# Patient Record
Sex: FEMALE | Race: BLACK OR AFRICAN AMERICAN | HISPANIC OR LATINO | Employment: OTHER | URBAN - METROPOLITAN AREA
[De-identification: names, ages, dates, MRNs, and addresses within clinical notes are randomized per-mention and may not be internally consistent; named-entity substitution may affect disease eponyms.]

---

## 2019-04-08 ENCOUNTER — HOSPITAL ENCOUNTER (EMERGENCY)
Facility: HOSPITAL | Age: 54
Discharge: HOME | End: 2019-04-08
Attending: EMERGENCY MEDICINE
Payer: COMMERCIAL

## 2019-04-08 VITALS
TEMPERATURE: 99 F | DIASTOLIC BLOOD PRESSURE: 61 MMHG | OXYGEN SATURATION: 98 % | SYSTOLIC BLOOD PRESSURE: 113 MMHG | HEART RATE: 87 BPM | RESPIRATION RATE: 18 BRPM

## 2019-04-08 DIAGNOSIS — M25.559 HIP PAIN: Primary | ICD-10-CM

## 2019-04-08 PROCEDURE — 99283 EMERGENCY DEPT VISIT LOW MDM: CPT

## 2019-04-08 PROCEDURE — 99283 EMERGENCY DEPT VISIT LOW MDM: CPT | Performed by: PHYSICIAN ASSISTANT

## 2019-04-08 RX ORDER — CYCLOBENZAPRINE HCL 10 MG
10 TABLET ORAL 2 TIMES DAILY PRN
Qty: 12 TABLET | Refills: 0 | Status: SHIPPED | OUTPATIENT
Start: 2019-04-08 | End: 2019-07-15 | Stop reason: ALTCHOICE

## 2019-04-08 RX ORDER — CAPSAICIN 0.07 G/100G
CREAM TOPICAL 3 TIMES DAILY
Qty: 28.3 G | Refills: 0 | Status: SHIPPED | OUTPATIENT
Start: 2019-04-08 | End: 2019-07-15 | Stop reason: ALTCHOICE

## 2019-11-12 ENCOUNTER — TRANSCRIBE ORDERS (OUTPATIENT)
Dept: ADMINISTRATIVE | Facility: HOSPITAL | Age: 54
End: 2019-11-12

## 2019-11-12 DIAGNOSIS — Z13.820 SCREENING FOR OSTEOPOROSIS: Primary | ICD-10-CM

## 2019-11-12 DIAGNOSIS — Z12.31 VISIT FOR SCREENING MAMMOGRAM: ICD-10-CM

## 2019-12-30 ENCOUNTER — HOSPITAL ENCOUNTER (OUTPATIENT)
Dept: RADIOLOGY | Facility: HOSPITAL | Age: 54
Discharge: HOME | End: 2019-12-30
Payer: COMMERCIAL

## 2019-12-30 DIAGNOSIS — Z13.820 SCREENING FOR OSTEOPOROSIS: ICD-10-CM

## 2019-12-30 DIAGNOSIS — Z12.31 VISIT FOR SCREENING MAMMOGRAM: ICD-10-CM

## 2019-12-30 PROCEDURE — 77080 DXA BONE DENSITY AXIAL: CPT

## 2019-12-30 PROCEDURE — 77067 SCR MAMMO BI INCL CAD: CPT

## 2019-12-30 PROCEDURE — 77063 BREAST TOMOSYNTHESIS BI: CPT

## 2020-11-11 ENCOUNTER — TRANSCRIBE ORDERS (OUTPATIENT)
Dept: ADMINISTRATIVE | Facility: HOSPITAL | Age: 55
End: 2020-11-11

## 2020-11-11 DIAGNOSIS — Z12.31 ENCOUNTER FOR SCREENING MAMMOGRAM FOR MALIGNANT NEOPLASM OF BREAST: Primary | ICD-10-CM

## 2021-01-05 ENCOUNTER — HOSPITAL ENCOUNTER (OUTPATIENT)
Dept: RADIOLOGY | Facility: HOSPITAL | Age: 56
Discharge: HOME | End: 2021-01-05
Payer: COMMERCIAL

## 2021-01-05 VITALS — HEIGHT: 66 IN | BODY MASS INDEX: 27.8 KG/M2 | WEIGHT: 173 LBS

## 2021-01-05 DIAGNOSIS — Z12.31 ENCOUNTER FOR SCREENING MAMMOGRAM FOR MALIGNANT NEOPLASM OF BREAST: ICD-10-CM

## 2021-01-05 PROCEDURE — 77067 SCR MAMMO BI INCL CAD: CPT

## 2021-01-05 PROCEDURE — 77063 BREAST TOMOSYNTHESIS BI: CPT

## 2022-04-26 ENCOUNTER — HOSPITAL ENCOUNTER (OUTPATIENT)
Dept: RADIOLOGY | Facility: HOSPITAL | Age: 57
Discharge: HOME | End: 2022-04-26
Payer: COMMERCIAL

## 2022-04-26 VITALS — HEIGHT: 66 IN | BODY MASS INDEX: 30.05 KG/M2 | WEIGHT: 187 LBS

## 2022-04-26 DIAGNOSIS — Z12.31 ENCOUNTER FOR SCREENING MAMMOGRAM FOR MALIGNANT NEOPLASM OF BREAST: ICD-10-CM

## 2022-04-26 PROCEDURE — 77063 BREAST TOMOSYNTHESIS BI: CPT

## 2022-04-26 PROCEDURE — 77067 SCR MAMMO BI INCL CAD: CPT

## 2022-06-03 ENCOUNTER — HOSPITAL ENCOUNTER (OUTPATIENT)
Dept: RADIOLOGY | Facility: HOSPITAL | Age: 57
Discharge: HOME | End: 2022-06-03
Payer: COMMERCIAL

## 2022-06-03 DIAGNOSIS — M25.561 RIGHT KNEE PAIN, UNSPECIFIED CHRONICITY: ICD-10-CM

## 2022-06-03 PROCEDURE — 73562 X-RAY EXAM OF KNEE 3: CPT

## 2022-07-06 ENCOUNTER — APPOINTMENT (EMERGENCY)
Dept: RADIOLOGY | Facility: HOSPITAL | Age: 57
End: 2022-07-06
Payer: COMMERCIAL

## 2022-07-06 ENCOUNTER — HOSPITAL ENCOUNTER (EMERGENCY)
Facility: HOSPITAL | Age: 57
Discharge: HOME | End: 2022-07-06
Attending: EMERGENCY MEDICINE
Payer: COMMERCIAL

## 2022-07-06 VITALS
TEMPERATURE: 98.4 F | OXYGEN SATURATION: 98 % | SYSTOLIC BLOOD PRESSURE: 115 MMHG | DIASTOLIC BLOOD PRESSURE: 68 MMHG | HEART RATE: 77 BPM | RESPIRATION RATE: 18 BRPM

## 2022-07-06 DIAGNOSIS — M25.462 EFFUSION OF LEFT KNEE: Primary | ICD-10-CM

## 2022-07-06 DIAGNOSIS — S83.92XA LEFT KNEE SPRAIN: ICD-10-CM

## 2022-07-06 PROCEDURE — 73562 X-RAY EXAM OF KNEE 3: CPT

## 2022-07-06 PROCEDURE — 99284 EMERGENCY DEPT VISIT MOD MDM: CPT | Performed by: EMERGENCY MEDICINE

## 2022-07-06 PROCEDURE — 99283 EMERGENCY DEPT VISIT LOW MDM: CPT

## 2022-07-06 RX ORDER — OXYCODONE HYDROCHLORIDE AND ACETAMINOPHEN 5; 325 MG/1; MG/1
1 TABLET ORAL EVERY 4 HOURS PRN
Qty: 12 TABLET | Refills: 0 | Status: SHIPPED | OUTPATIENT
Start: 2022-07-06 | End: 2022-07-06 | Stop reason: SDUPTHER

## 2022-07-06 RX ORDER — OXYCODONE HYDROCHLORIDE AND ACETAMINOPHEN 5; 325 MG/1; MG/1
1 TABLET ORAL EVERY 4 HOURS PRN
Qty: 12 TABLET | Refills: 0 | Status: SHIPPED | OUTPATIENT
Start: 2022-07-06 | End: 2022-07-16

## 2022-07-06 RX ORDER — OXYCODONE HYDROCHLORIDE 5 MG/1
5 TABLET ORAL ONCE
Status: COMPLETED | OUTPATIENT
Start: 2022-07-06 | End: 2022-07-06

## 2022-07-06 RX ADMIN — OXYCODONE HYDROCHLORIDE 5 MG: 5 TABLET ORAL at 16:58

## 2022-07-06 NOTE — ED ATTENDING ATTESTATION
7/6/2022  ISpring DO, saw and evaluated the patient  I have discussed the patient with the resident/non-physician practitioner and agree with the resident's/non-physician practitioner's findings, Plan of Care, and MDM as documented in the resident's/non-physician practitioner's note, except where noted  All available labs and Radiology studies were reviewed  I was present for key portions of any procedure(s) performed by the resident/non-physician practitioner and I was immediately available to provide assistance  At this point I agree with the current assessment done in the Emergency Department  I have conducted an independent evaluation of this patient a history and physical is as follows:    ED Course   55year old female presents with acute on chronic left knee pain  Pt  Reports the knee feels unstable - last night it buckled while standing  She denies fall  She has developed sore welling and significant pain with weight-bearing in the left knee since the buckling incident last evening  She has had similar pain and symptoms in the right knee  She was planning to start physical therapy for the right knee  She denies fevers or chills  No skin redness or changes  No prior knee surgery  Patient has been taking over-the-counter medications without relief  Past medical history:  Hypothyroid, asthma, osteoarthritis    Physical exam:  Patient is awake and alert, no acute distress  The left knee has a mild-to-moderate effusion  There is tenderness along the medial joint line  The knee is stable to anterior and posterior drawer testing  No pain elicited on Apley compression test   No instability with varus or valgus stress applied to the knee  No tenderness of the left hip or ankle  No lower extremity swelling  Plan:  Suspect knee sprain possible ligamentous injury verses meniscal injury  Will check x-ray  X-ray reviewed and interpreted by me no acute fracture    Will immobilize and provide crutches with follow-up to see orthopedics for re-evaluation  Patient was given a small quantity of pain medication to use for pain uncontrolled with NSAIDs     Critical Care Time  Procedures

## 2022-07-06 NOTE — ED PROVIDER NOTES
History  Chief Complaint   Patient presents with    Leg Pain     Left leg pain s/p left leg buckling last night  Pt c/o left leg and knee pain  Tried OTC pain meds and ice  Was suppose to start physical therapy tomorrow  Has been having bilateral leg pain x 1 month  HPI patient is a 81-year-old female past medical history of osteoarthritis bilateral knees  She presents today with left knee pain and instability beginning last night while standing to grab an item off a shelf  She felt her knee shift at the time and described a shooting pain up her left thigh  The patient was seen by her PCP previously for left knee pain and was given naproxen 5 mg but she was reluctant to take it "after reading side effects"  She did take one 500 mg naproxen tablet last night in addition to ice and compression but she states that it did not help  The patient also admits to a crepitus like feeling in her left knee with range of motion and walking up stairs  The pain is mostly aggravated by walking and weight-bearing on the left knee  Prior to Admission Medications   Prescriptions Last Dose Informant Patient Reported?  Taking?   ipratropium (ATROVENT) 0 03 % nasal spray   No No   Si sprays into each nostril 2 (two) times a day as needed for rhinitis   levothyroxine 125 mcg tablet   Yes No   Sig: Take 125 mcg by mouth daily      Facility-Administered Medications: None       Past Medical History:   Diagnosis Date    Disease of thyroid gland        Past Surgical History:   Procedure Laterality Date    AUGMENTATION MAMMAPLASTY Bilateral 2007    COSMETIC SURGERY      Breast implants    HERNIA REPAIR      HYSTERECTOMY      partial     NECK SURGERY         Family History   Problem Relation Age of Onset    Colon cancer Father 68    No Known Problems Mother     No Known Problems Sister     No Known Problems Daughter     Pancreatic cancer Maternal Grandmother     No Known Problems Daughter     No Known Problems Maternal Aunt     No Known Problems Paternal Aunt     No Known Problems Paternal Uncle      I have reviewed and agree with the history as documented  E-Cigarette/Vaping     E-Cigarette/Vaping Substances     Social History     Tobacco Use    Smoking status: Never Smoker    Smokeless tobacco: Never Used   Substance Use Topics    Alcohol use: Never    Drug use: Never        Review of Systems   Constitutional: Negative for chills, fatigue and fever  Respiratory: Negative for shortness of breath  Cardiovascular: Negative for palpitations  Musculoskeletal: Positive for gait problem and joint swelling (Left knee effusion)  Skin: Negative for color change, pallor, rash and wound  Neurological: Negative for dizziness and weakness  Physical Exam  ED Triage Vitals   Temperature Pulse Respirations Blood Pressure SpO2   07/06/22 1322 07/06/22 1321 07/06/22 1321 07/06/22 1321 07/06/22 1321   98 4 °F (36 9 °C) 77 18 115/68 98 %      Temp Source Heart Rate Source Patient Position - Orthostatic VS BP Location FiO2 (%)   07/06/22 1321 07/06/22 1321 -- -- --   Oral Monitor         Pain Score       07/06/22 1321       5             Orthostatic Vital Signs  Vitals:    07/06/22 1321   BP: 115/68   Pulse: 77       Physical Exam  Constitutional:       General: She is not in acute distress  Appearance: Normal appearance  HENT:      Head: Normocephalic and atraumatic  Musculoskeletal:         General: Swelling and tenderness present  No deformity  Left knee: Swelling and effusion present  No deformity, erythema, ecchymosis, lacerations or bony tenderness  Normal range of motion  Tenderness present over the medial joint line  Left lower leg: Edema present  Skin:     General: Skin is warm and dry  Findings: No bruising or erythema  Neurological:      General: No focal deficit present  Mental Status: She is alert and oriented to person, place, and time           ED Medications  Medications   oxyCODONE (ROXICODONE) IR tablet 5 mg (5 mg Oral Given 7/6/22 5721)       Diagnostic Studies  Results Reviewed     None                 XR knee 3 views left non injury    (Results Pending)         Procedures  Procedures      ED Course           Discussed case with pt and she is agreeable to be discharged home with crutches, a knee immobilizer, and pain medication  She was given information for orthopedic f/u  She was agreeable to this plan  SBIRT 20yo+    Flowsheet Row Most Recent Value   SBIRT (23 yo +)    In order to provide better care to our patients, we are screening all of our patients for alcohol and drug use  Would it be okay to ask you these screening questions? Unable to answer at this time Filed at: 07/06/2022 0323                [facility]     Left knee x-ray: no acute disease      Left knee sprain  Left knee osteoarthritis  Left knee meniscus tear  Left knee bursitis      Disposition  Final diagnoses:   Effusion of left knee   Left knee sprain     Time reflects when diagnosis was documented in both MDM as applicable and the Disposition within this note     Time User Action Codes Description Comment    7/6/2022  5:20 PM Usman Valencia Add [M25 [id number]] Effusion of left knee     7/6/2022  5:20 PM Spring Talley Add [S83  [id number]] Left knee sprain       ED Disposition     ED Disposition   Discharge    Condition   Stable    Date/Time   Wed Jul 6, 2022  5:20 PM    Comment   Haris Pena discharge to home/self care                 Follow-up Information     Follow up With Specialties Details Why Contact Info Additional [address] [facility] Schedule an appointment as soon as possible for a visit in 1 day For recheck of current symptoms Jerry Razo [address] [facility] [place], [facility], [address] [facility] Discharge Medication List as of 7/6/2022  5:27 PM      CONTINUE these medications which have CHANGED    Details   oxyCODONE-acetaminophen (PERCOCET) 5-325 mg per tablet Take 1 tablet by mouth every 4 (four) hours as needed for moderate pain for up to 10 days Max Daily Amount: 6 tablets, Starting Wed 7/6/2022, Until Sat 7/16/2022 at 2359, Normal         CONTINUE these medications which have NOT CHANGED    Details   ipratropium (ATROVENT) 0 03 % nasal spray 2 sprays into each nostril 2 (two) times a day as needed for rhinitis, Starting Mon 7/15/2019, Normal      levothyroxine 125 mcg tablet Take 125 mcg by mouth daily, Starting Mon 6/10/2019, Historical Med           No discharge procedures on file  PDMP Review     None           ED Provider  Attending physically available and evaluated Ladi Narayanan I managed the patient along with the ED Attending      Electronically Signed by         Courtney Davison,   07/06/22 [address], DO  07/06/22 9837

## 2023-03-06 LAB
EXTERNAL HIV SCREEN: NORMAL
HCV AB SER-ACNC: NON REACTIVE

## 2023-07-13 ENCOUNTER — HOSPITAL ENCOUNTER (OUTPATIENT)
Dept: RADIOLOGY | Facility: HOSPITAL | Age: 58
Discharge: HOME | End: 2023-07-13
Payer: COMMERCIAL

## 2023-07-13 VITALS — BODY MASS INDEX: 32.43 KG/M2 | HEIGHT: 63 IN | WEIGHT: 183 LBS

## 2023-07-13 DIAGNOSIS — Z12.31 ENCOUNTER FOR SCREENING MAMMOGRAM FOR MALIGNANT NEOPLASM OF BREAST: ICD-10-CM

## 2023-07-13 PROCEDURE — 77063 BREAST TOMOSYNTHESIS BI: CPT

## 2023-07-13 PROCEDURE — 77067 SCR MAMMO BI INCL CAD: CPT

## 2023-11-03 ENCOUNTER — TELEPHONE (OUTPATIENT)
Dept: PSYCHIATRY | Facility: CLINIC | Age: 58
End: 2023-11-03

## 2023-11-03 ENCOUNTER — OFFICE VISIT (OUTPATIENT)
Dept: INTERNAL MEDICINE CLINIC | Facility: CLINIC | Age: 58
End: 2023-11-03
Payer: COMMERCIAL

## 2023-11-03 VITALS
SYSTOLIC BLOOD PRESSURE: 111 MMHG | DIASTOLIC BLOOD PRESSURE: 70 MMHG | BODY MASS INDEX: 31.32 KG/M2 | HEART RATE: 68 BPM | OXYGEN SATURATION: 97 % | WEIGHT: 188 LBS | TEMPERATURE: 99.4 F | HEIGHT: 65 IN

## 2023-11-03 DIAGNOSIS — Z00.00 ENCOUNTER FOR MEDICAL EXAMINATION TO ESTABLISH CARE: ICD-10-CM

## 2023-11-03 DIAGNOSIS — Z00.00 WELLNESS EXAMINATION: Primary | ICD-10-CM

## 2023-11-03 PROCEDURE — 99386 PREV VISIT NEW AGE 40-64: CPT | Performed by: INTERNAL MEDICINE

## 2023-11-03 RX ORDER — LEVOTHYROXINE SODIUM 0.15 MG/1
150 TABLET ORAL DAILY
COMMUNITY

## 2023-11-03 NOTE — TELEPHONE ENCOUNTER
Patient LVM requesting a call back. Writer spoke with patient. 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 Writer stated one time evaluations were not done at this office and provided a telephone number for an office that performed one time evaluations.

## 2023-11-03 NOTE — ASSESSMENT & PLAN NOTE
Patient presents to the office to establish care. Patient has hypothyroidism and history of cervical spondylosis s/p fusion and discectomy. No new complaints today.

## 2023-11-03 NOTE — PROGRESS NOTES
ADULT ANNUAL [facility] INTERNAL MEDICINE TED    NAME: Christophe Anne  AGE: 63 y.o. SEX: female  : 1965     DATE: 11/3/2023     Assessment and Plan:     Problem List Items Addressed This Visit       Encounter for medical examination to establish care     Patient presents to the office to establish care. Patient has hypothyroidism and history of cervical spondylosis s/p fusion and discectomy. No new complaints today. Wellness examination - Primary     No new complaints. 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 Patient had annual physical on March this year, blood work was done and it was normal according to patient. We will get patient's consent to collect blood work results from her previous PCP. Patient has had partial hysterectomy and not having Pap smears since then. Her last Pap smear was normal according to patient. Last mammogram was this year and it was normal.  Patient had a colonoscopy last year and it was normal.  Last ophthalmology visit was in 2019, unremarkable. Has a family history of prostate cancer and hypertension. Plan  Psychological assessment: for work  Patient refused flu shot  Would collect her blood work results from previous PCP. Continue levothyroxine 150 mcg. Relevant Orders    Ambulatory referral to Psych Services       Immunizations and preventive care screenings were discussed with patient today. Appropriate education was printed on patient's after visit summary. Counseling:  Dental Health: discussed importance of regular tooth brushing, flossing, and dental visits. Injury prevention: discussed safety/seat belts, safety helmets, smoke detectors, carbon dioxide detectors, and smoking near bedding or upholstery.   Sexual health: discussed sexually transmitted diseases, partner selection, use of condoms, avoidance of unintended pregnancy, and contraceptive alternatives. Exercise: the importance of regular exercise/physical activity was discussed. Recommend exercise 3-5 times per week for at least 30 minutes. BMI Counseling: Body mass index is 31.53 kg/m². The BMI is above normal. Nutrition recommendations include encouraging healthy choices of fruits and vegetables and moderation in carbohydrate intake. Exercise recommendations include moderate physical activity 150 minutes/week. No pharmacotherapy was ordered. Rationale for BMI follow-up plan is due to patient being overweight or obese. Depression Screening and Follow-up Plan: Patient was screened for depression during today's encounter. They screened negative with a PHQ-2 score of 0. No follow-ups on file. Chief Complaint:     Chief Complaint   Patient presents with    Physical Exam     For work    Establish Care      History of Present Illness:     Adult Annual Physical   Patient here for a comprehensive physical exam. The patient reports no problems. Diet and Physical Activity  Diet/Nutrition: well balanced diet. Exercise: 3-4 times a week on average. Depression Screening  PHQ-2/9 Depression Screening    Little interest or pleasure in doing things: 0 - not at all  Feeling down, depressed, or hopeless: 0 - not at all  PHQ-2 Score: 0  PHQ-2 Interpretation: Negative depression screen       General Health  Sleep: sleeps well. Hearing: normal - bilateral.  Vision: no vision problems. Dental: regular dental visits. /GYN Health  Patient is: postmenopausal  Last menstrual period: cannot recall, years back. Contraceptive method: not using any    Advanced Care Planning  Do you have an advanced directive? no  Do you have a durable medical power of ? no     Review of Systems:     Review of Systems   Constitutional:  Negative for chills, fatigue and fever. HENT:  Negative for congestion and sore throat.     Respiratory:  Negative for cough and shortness of breath. Cardiovascular:  Negative for chest pain, palpitations and leg swelling. Gastrointestinal:  Negative for abdominal pain, blood in stool, constipation, diarrhea, nausea and vomiting. Genitourinary:  Negative for dysuria, flank pain and frequency. Skin:  Negative for pallor and rash. Neurological:  Negative for dizziness, light-headedness and headaches. Psychiatric/Behavioral:  Negative for agitation, behavioral problems and confusion. Past Medical History:     Past Medical History:   Diagnosis Date    Disease of thyroid gland       Past Surgical History:     Past Surgical History:   Procedure Laterality Date    AUGMENTATION MAMMAPLASTY Bilateral 2007    [place]    COSMETIC SURGERY      Breast implants    HERNIA REPAIR      HYSTERECTOMY      partial     NECK SURGERY        Social History:     Social History     Socioeconomic History    Marital status: /Civil Union     Spouse name: None    Number of children: None    Years of education: None    Highest education level: None   Occupational History    None   Tobacco Use    Smoking status: Never    Smokeless tobacco: Never   Substance and Sexual Activity    Alcohol use: Never    Drug use: Never    Sexual activity: None   Other Topics Concern    None   Social History Narrative    None     Social Determinants of Health     Financial Resource Strain: Low Risk  (11/3/2023)    Overall Financial Resource Strain (CARDIA)     Difficulty of Paying Living Expenses: Not hard at all   Food Insecurity: No Food Insecurity (11/3/2023)    Hunger Vital Sign     Worried About Running Out of Food in the Last Year: Never true     Ran Out of Food in the Last Year: Never true   Transportation Needs: No Transportation Needs (11/3/2023)    PRAPARE - Transportation     Lack of Transportation (Medical): No     Lack of Transportation (Non-Medical):  No   Physical Activity: Sufficiently Active (11/3/2023)    Exercise Vital Sign     Days of Exercise per Week: 4 days     Minutes of Exercise per Session: 60 min   Stress: No Stress Concern Present (11/3/2023)    [facility]     Feeling of Stress : Not at all   Social Connections: [facility] (11/3/2023)    Social Connection and Isolation Panel [NHANES]     Frequency of Communication with Friends and Family: More than three times a week     Frequency of Social Gatherings with Friends and Family: More than three times a week     Attends [employer] Services: More than 4 times per year     Active Member of [employer] or Organizations: Yes     Attends Club or Organization Meetings: More than 4 times per year     Marital Status:    Intimate Partner Violence: Not At Risk (11/3/2023)    Humiliation, Afraid, Rape, and Kick questionnaire     Fear of Current or Ex-Partner: No     Emotionally Abused: No     Physically Abused: No     Sexually Abused: No   Housing Stability: Unknown (11/3/2023)    Housing Stability Vital Sign     Unable to Pay for Housing in the Last Year: No     Number of [address] in the Last Year: Not on file     Unstable Housing in the Last Year: No      Family History:     Family History   Problem Relation Age of Onset    Colon cancer Father 68    No Known Problems Mother     No Known Problems Sister     No Known Problems Daughter     Pancreatic cancer Maternal Grandmother     No Known Problems Daughter     No Known Problems Maternal Aunt     No Known Problems Paternal Aunt     No Known Problems Paternal Uncle       Current Medications:     Current Outpatient Medications   Medication Sig Dispense Refill    levothyroxine 150 mcg tablet Take 150 mcg by mouth daily      ipratropium (ATROVENT) 0.03 % nasal spray 2 sprays into each nostril 2 (two) times a day as needed for rhinitis (Patient not taking: Reported on 11/3/2023) 30 mL 3    levothyroxine 125 mcg tablet Take 125 mcg by mouth daily (Patient not taking: Reported on 11/3/2023)  0 No current facility-administered medications for this visit. Allergies:     No Known Allergies   Physical Exam:     /70 (BP Location: Right arm, Patient Position: Sitting, Cuff Size: Large)   Pulse 68   Temp 99.4 °F (37.4 °C) (Tympanic)   Ht 5' 4.75" (1.645 m)   Wt 85.3 kg (188 lb)   SpO2 97%   BMI 31.53 kg/m²     Physical Exam  Vitals reviewed. Constitutional:       General: She is not in acute distress. Appearance: Normal appearance. She is not ill-appearing, toxic-appearing or diaphoretic. HENT:      Head: Normocephalic. Nose: No congestion. Mouth/Throat:      Mouth: Mucous membranes are moist.      Pharynx: Oropharynx is clear. Cardiovascular:      Rate and Rhythm: Normal rate and regular rhythm. Pulses: Normal pulses. Heart sounds: Normal heart sounds. Pulmonary:      Effort: Pulmonary effort is normal.      Breath sounds: Normal breath sounds. Abdominal:      General: Bowel sounds are normal.      Palpations: Abdomen is soft. Musculoskeletal:      Right lower leg: No edema. Left lower leg: No edema. Skin:     General: Skin is warm and dry. Capillary Refill: Capillary refill takes less than 2 seconds. Neurological:      General: No focal deficit present. Mental Status: She is alert and oriented to person, place, and time.    Psychiatric:         Mood and Affect: Mood normal.         Behavior: Behavior normal.          Logan Aguilar MD  [facility] INTERNAL MEDICINE [facility]

## 2023-11-03 NOTE — ASSESSMENT & PLAN NOTE
No new complaints. 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 Patient had annual physical on March this year, blood work was done and it was normal according to patient. We will get patient's consent to collect blood work results from her previous PCP. Patient has had partial hysterectomy and not having Pap smears since then. Her last Pap smear was normal according to patient. Last mammogram was this year and it was normal.  Patient had a colonoscopy last year and it was normal.  Last ophthalmology visit was in 2019, unremarkable. Has a family history of prostate cancer and hypertension. Plan  Psychological assessment: for work  Patient refused flu shot  Would collect her blood work results from previous PCP. Continue levothyroxine 150 mcg.

## 2023-11-08 ENCOUNTER — TELEPHONE (OUTPATIENT)
Dept: PSYCHIATRY | Facility: CLINIC | Age: 58
End: 2023-11-08

## 2023-11-08 NOTE — TELEPHONE ENCOUNTER
Writer has sent [employer] referral message if no response within 15 days writer will reach out 1x via phone. Then referral will be closed if no response.

## 2024-02-21 ENCOUNTER — APPOINTMENT (EMERGENCY)
Dept: CT IMAGING | Facility: HOSPITAL | Age: 59
End: 2024-02-21
Payer: COMMERCIAL

## 2024-02-21 ENCOUNTER — HOSPITAL ENCOUNTER (EMERGENCY)
Facility: HOSPITAL | Age: 59
Discharge: HOME | End: 2024-02-21
Attending: EMERGENCY MEDICINE
Payer: COMMERCIAL

## 2024-02-21 VITALS
DIASTOLIC BLOOD PRESSURE: 72 MMHG | TEMPERATURE: 98.4 F | SYSTOLIC BLOOD PRESSURE: 112 MMHG | RESPIRATION RATE: 16 BRPM | WEIGHT: 195.55 LBS | OXYGEN SATURATION: 98 % | BODY MASS INDEX: 32.79 KG/M2 | HEART RATE: 88 BPM

## 2024-02-21 DIAGNOSIS — R10.31 ACUTE BILATERAL LOWER ABDOMINAL PAIN: ICD-10-CM

## 2024-02-21 DIAGNOSIS — K57.92 DIVERTICULITIS: Primary | ICD-10-CM

## 2024-02-21 DIAGNOSIS — R10.32 ACUTE BILATERAL LOWER ABDOMINAL PAIN: ICD-10-CM

## 2024-02-21 LAB
ALBUMIN SERPL BCP-MCNC: 4.3 G/DL (ref 3.5–5)
ALP SERPL-CCNC: 101 U/L (ref 34–104)
ALT SERPL W P-5'-P-CCNC: 20 U/L (ref 7–52)
AMORPH URATE CRY URNS QL MICRO: NORMAL
ANION GAP SERPL CALCULATED.3IONS-SCNC: 6 MMOL/L
AST SERPL W P-5'-P-CCNC: 28 U/L (ref 13–39)
BACTERIA UR QL AUTO: NORMAL /HPF
BASOPHILS # BLD AUTO: 0.03 THOUSANDS/ÂΜL (ref 0–0.1)
BASOPHILS NFR BLD AUTO: 0 % (ref 0–1)
BILIRUB SERPL-MCNC: 0.3 MG/DL (ref 0.2–1)
BILIRUB UR QL STRIP: NEGATIVE
BUN SERPL-MCNC: 20 MG/DL (ref 5–25)
CALCIUM SERPL-MCNC: 9.2 MG/DL (ref 8.4–10.2)
CHLORIDE SERPL-SCNC: 107 MMOL/L (ref 96–108)
CLARITY UR: ABNORMAL
CO2 SERPL-SCNC: 27 MMOL/L (ref 21–32)
COLOR UR: ABNORMAL
CREAT SERPL-MCNC: 0.8 MG/DL (ref 0.6–1.3)
EOSINOPHIL # BLD AUTO: 0.22 THOUSAND/ÂΜL (ref 0–0.61)
EOSINOPHIL NFR BLD AUTO: 3 % (ref 0–6)
ERYTHROCYTE [DISTWIDTH] IN BLOOD BY AUTOMATED COUNT: 13.4 % (ref 11.6–15.1)
GFR SERPL CREATININE-BSD FRML MDRD: 82 ML/MIN/1.73SQ M
GLUCOSE SERPL-MCNC: 114 MG/DL (ref 65–140)
GLUCOSE UR STRIP-MCNC: NEGATIVE MG/DL
HCT VFR BLD AUTO: 38.3 % (ref 34.8–46.1)
HGB BLD-MCNC: 12.6 G/DL (ref 11.5–15.4)
HGB UR QL STRIP.AUTO: NEGATIVE
IMM GRANULOCYTES # BLD AUTO: 0.02 THOUSAND/UL (ref 0–0.2)
IMM GRANULOCYTES NFR BLD AUTO: 0 % (ref 0–2)
KETONES UR STRIP-MCNC: NEGATIVE MG/DL
LEUKOCYTE ESTERASE UR QL STRIP: NEGATIVE
LIPASE SERPL-CCNC: 34 U/L (ref 11–82)
LYMPHOCYTES # BLD AUTO: 2.2 THOUSANDS/ÂΜL (ref 0.6–4.47)
LYMPHOCYTES NFR BLD AUTO: 27 % (ref 14–44)
MCH RBC QN AUTO: 31.6 PG (ref 26.8–34.3)
MCHC RBC AUTO-ENTMCNC: 32.9 G/DL (ref 31.4–37.4)
MCV RBC AUTO: 96 FL (ref 82–98)
MONOCYTES # BLD AUTO: 0.66 THOUSAND/ÂΜL (ref 0.17–1.22)
MONOCYTES NFR BLD AUTO: 8 % (ref 4–12)
NEUTROPHILS # BLD AUTO: 4.92 THOUSANDS/ÂΜL (ref 1.85–7.62)
NEUTS SEG NFR BLD AUTO: 62 % (ref 43–75)
NITRITE UR QL STRIP: NEGATIVE
NON-SQ EPI CELLS URNS QL MICRO: NORMAL /HPF
NRBC BLD AUTO-RTO: 0 /100 WBCS
PH UR STRIP.AUTO: 8 [PH]
PLATELET # BLD AUTO: 272 THOUSANDS/UL (ref 149–390)
PMV BLD AUTO: 9.7 FL (ref 8.9–12.7)
POTASSIUM SERPL-SCNC: 5.2 MMOL/L (ref 3.5–5.3)
PROT SERPL-MCNC: 7.3 G/DL (ref 6.4–8.4)
PROT UR STRIP-MCNC: ABNORMAL MG/DL
RBC # BLD AUTO: 3.99 MILLION/UL (ref 3.81–5.12)
RBC #/AREA URNS AUTO: NORMAL /HPF
SODIUM SERPL-SCNC: 140 MMOL/L (ref 135–147)
SP GR UR STRIP.AUTO: 1.02 (ref 1–1.03)
UROBILINOGEN UR STRIP-ACNC: <2 MG/DL
WBC # BLD AUTO: 8.05 THOUSAND/UL (ref 4.31–10.16)
WBC #/AREA URNS AUTO: NORMAL /HPF

## 2024-02-21 PROCEDURE — 83690 ASSAY OF LIPASE: CPT

## 2024-02-21 PROCEDURE — 85025 COMPLETE CBC W/AUTO DIFF WBC: CPT

## 2024-02-21 PROCEDURE — 96361 HYDRATE IV INFUSION ADD-ON: CPT

## 2024-02-21 PROCEDURE — 81001 URINALYSIS AUTO W/SCOPE: CPT | Performed by: PHYSICIAN ASSISTANT

## 2024-02-21 PROCEDURE — 96374 THER/PROPH/DIAG INJ IV PUSH: CPT

## 2024-02-21 PROCEDURE — 99284 EMERGENCY DEPT VISIT MOD MDM: CPT

## 2024-02-21 PROCEDURE — G1004 CDSM NDSC: HCPCS

## 2024-02-21 PROCEDURE — 36415 COLL VENOUS BLD VENIPUNCTURE: CPT

## 2024-02-21 PROCEDURE — 80053 COMPREHEN METABOLIC PANEL: CPT

## 2024-02-21 PROCEDURE — 74177 CT ABD & PELVIS W/CONTRAST: CPT

## 2024-02-21 PROCEDURE — 99285 EMERGENCY DEPT VISIT HI MDM: CPT | Performed by: PHYSICIAN ASSISTANT

## 2024-02-21 RX ORDER — KETOROLAC TROMETHAMINE 30 MG/ML
15 INJECTION, SOLUTION INTRAMUSCULAR; INTRAVENOUS ONCE
Status: COMPLETED | OUTPATIENT
Start: 2024-02-21 | End: 2024-02-21

## 2024-02-21 RX ORDER — METRONIDAZOLE 500 MG/1
500 TABLET ORAL ONCE
Status: COMPLETED | OUTPATIENT
Start: 2024-02-21 | End: 2024-02-21

## 2024-02-21 RX ORDER — CIPROFLOXACIN 500 MG/1
500 TABLET, FILM COATED ORAL 2 TIMES DAILY
Qty: 20 TABLET | Refills: 0 | Status: SHIPPED | OUTPATIENT
Start: 2024-02-21 | End: 2024-03-02

## 2024-02-21 RX ORDER — METRONIDAZOLE 500 MG/1
500 TABLET ORAL EVERY 8 HOURS SCHEDULED
Qty: 30 TABLET | Refills: 0 | Status: SHIPPED | OUTPATIENT
Start: 2024-02-21 | End: 2024-03-02

## 2024-02-21 RX ORDER — ONDANSETRON 4 MG/1
4 TABLET, FILM COATED ORAL EVERY 8 HOURS PRN
Qty: 9 TABLET | Refills: 0 | Status: SHIPPED | OUTPATIENT
Start: 2024-02-21 | End: 2024-02-24

## 2024-02-21 RX ORDER — CIPROFLOXACIN 500 MG/1
500 TABLET, FILM COATED ORAL ONCE
Status: COMPLETED | OUTPATIENT
Start: 2024-02-21 | End: 2024-02-21

## 2024-02-21 RX ADMIN — METRONIDAZOLE 500 MG: 500 TABLET ORAL at 22:25

## 2024-02-21 RX ADMIN — KETOROLAC TROMETHAMINE 15 MG: 30 INJECTION, SOLUTION INTRAMUSCULAR; INTRAVENOUS at 20:19

## 2024-02-21 RX ADMIN — CIPROFLOXACIN HYDROCHLORIDE 500 MG: 500 TABLET, FILM COATED ORAL at 22:25

## 2024-02-21 RX ADMIN — SODIUM CHLORIDE 1000 ML: 0.9 INJECTION, SOLUTION INTRAVENOUS at 19:38

## 2024-02-21 RX ADMIN — IOHEXOL 100 ML: 350 INJECTION, SOLUTION INTRAVENOUS at 21:08

## 2024-02-21 NOTE — Clinical Note
Gretel Chowdhury was seen and treated in our emergency department on 2/21/2024.                Diagnosis:     Gretel  .    She may return on this date: 02/23/2024         If you have any questions or concerns, please don't hesitate to call.      David Wolf PA-C    ______________________________           _______________          _______________  Hospital Representative                              Date                                Time

## 2024-02-22 NOTE — DISCHARGE INSTRUCTIONS
Show images for CT abdomen pelvis with contrast  Study Result    Wet Read   Reading Physician Reading Date Result Priority  Tejas Henley MD  [phone] 2/21/2024    Narrative & Impression  CT ABDOMEN AND PELVIS WITH IV CONTRAST     INDICATION: suprapubic pain.     COMPARISON: None.     TECHNIQUE: CT examination of the abdomen and pelvis was performed. Multiplanar 2D reformatted images were created from the source data.     This examination, like all CT scans performed in the [facility], was performed utilizing techniques to minimize radiation dose exposure, including the use of iterative reconstruction and automated exposure control. Radiation dose length  product (DLP) for this visit: 814 mGy-cm     IV Contrast: 100 mL of iohexol (OMNIPAQUE)  Enteric Contrast: Not administered.     FINDINGS:     ABDOMEN     LOWER CHEST: No clinically significant abnormality in the visualized lower chest. Partially visualized bilateral lower chest breast implant prosthesis without evidence for extracapsular rupture.     LIVER/BILIARY TREE: Liver is norm  al in size and contour without enhancing mass. Multiple too small to characterize hepatic hypodensities noted.     GALLBLADDER: Contracted without wall thickening or calcified gallstones. No pericholecystic inflammatory change.     SPLEEN: Unremarkable.     PANCREAS: Unremarkable.     ADRENAL GLANDS: Unremarkable.     KIDNEYS/URETERS: Unremarkable. No hydronephrosis.     STOMACH AND BOWEL: Stomach is moderately distended with heterogeneous density debris. The small and large bowel loops appear normal in course and caliber without obstruction seen. Terminal ileum and appendix are unremarkable. Extensive descending and sigmoid colon diverticulosis noted. Abnormal wall thickening and mild surrounding pelvic mesenteric inflammatory stranding involving the proximal sigmoid colon is seen without pericolonic free air or abscess/fluid collection. Findings suggest acute  focal colitis/diverticulitis. Cannot exclude neoplasm with superimposed infection. Recommend follow-up colonoscopy when c  linically appropriate.     APPENDIX: No findings to suggest appendicitis.     ABDOMINOPELVIC CAVITY: Trace lower pelvic free fluid. No loculated fluid collections. No pneumoperitoneum. No lymphadenopathy by size criteria. Nonspecific subcentimeter mesenteric lymph nodes noted.     VESSELS: Unremarkable for patient's age.     PELVIS     REPRODUCTIVE ORGANS: Post hysterectomy. A few punctate left pelvic phleboliths.     URINARY BLADDER: Unremarkable.     ABDOMINAL WALL/INGUINAL REGIONS: Unremarkable.     BONES: No acute fracture or suspicious osseous lesion.     IMPRESSION:  Findings consistent with acute focal colitis/diverticulitis of the proximal sigmoid colon.  Cannot exclude neoplasm with superimposed infection. Recommend follow-up colonoscopy when clinically appropriate. Extensive descending and sigmoid diverticulosis.   Trace lower pelvic free fluid. No evidence for bowel obstruction, obstructive uropathy, appendicitis, free air or loculated fluid collections in the abdomen/pelvi  s.           Workstation performed: [id number]       Interpreted by David Wolf PA-C on 2/21/2024 10:10 PM

## 2024-02-22 NOTE — ED PROVIDER NOTES
History  Chief Complaint   Patient presents with    Abdominal Pain     Pt reports lower abdominal pain since Sunday. Reported diarrhea on Sunday and since then has been experiencing this sharp pain. Pain is now dull. Denies urinary sx.      Patient is a 58-year-old female with a history of hysterectomy presents to the emergency department with dull aching persistent nonradiating lower abdominal pain for 3 days.  Patient also states that she has associate symptomatology of urinary frequency getting with the current ED presentation of lower abdominal pain.  Patient denies recent antibiotic use.  Patient does say that she had diarrhea symptoms starting with a lower abdominal pain, however diarrhea symptoms had completely dated by Monday of this week, total of 1 day of diarrhea.  Patient denies vaginal bleeding and vaginal discharge.  Patient denies palliative factors with provocative factors of pressure to lower abdomen.  Patient has not effective treatment.  Patient denies fevers, chills, nausea, vomiting, diarrhea, and constipation.  Patient denies recent fall recent trauma.  Patient denies sick contacts or recent travel.  Patient denies chest pain and shortness of breath.      History provided by:  Patient   used: No    Abdominal Pain  Associated symptoms: no chest pain, no chills, no constipation, no cough, no diarrhea, no dysuria, no fever, no nausea, no shortness of breath, no sore throat and no vomiting        Prior to Admission Medications   Prescriptions Last Dose Informant Patient Reported? Taking?   levothyroxine 150 mcg tablet   Yes No   Sig: Take 150 mcg by mouth daily      Facility-Administered Medications: None       Past Medical History:   Diagnosis Date    Disease of thyroid gland        Past Surgical History:   Procedure Laterality Date    AUGMENTATION MAMMAPLASTY Bilateral 2007    [place]    COSMETIC SURGERY      Breast implants    HERNIA REPAIR      HYSTERECTOMY      partial      NECK SURGERY         Family History   Problem Relation Age of Onset    Colon cancer Father 73    No Known Problems Mother     No Known Problems Sister     No Known Problems Daughter     Pancreatic cancer Maternal Grandmother     No Known Problems Daughter     No Known Problems Maternal Aunt     No Known Problems Paternal Aunt     No Known Problems Paternal Uncle      I have reviewed and agree with the history as documented.    E-Cigarette/Vaping     E-Cigarette/Vaping Substances     Social History     Tobacco Use    Smoking status: Never    Smokeless tobacco: Never   Substance Use Topics    Alcohol use: Never    Drug use: Never       Review of Systems   Constitutional:  Negative for activity change, appetite change, chills and fever.   HENT:  Negative for congestion, postnasal drip, rhinorrhea, sinus pressure, sinus pain, sore throat and tinnitus.    Eyes:  Negative for photophobia and visual disturbance.   Respiratory:  Negative for cough, chest tightness and shortness of breath.    Cardiovascular:  Negative for chest pain and palpitations.   Gastrointestinal:  Positive for abdominal pain. Negative for constipation, diarrhea, nausea and vomiting.   Genitourinary:  Positive for urgency. Negative for difficulty urinating, dysuria, flank pain and frequency.   Musculoskeletal:  Negative for back pain, gait problem, neck pain and neck stiffness.   Skin:  Negative for pallor and rash.   Allergic/Immunologic: Negative for environmental allergies and food allergies.   Neurological:  Negative for dizziness, weakness, numbness and headaches.   Psychiatric/Behavioral:  Negative for confusion.    All other systems reviewed and are negative.      Physical Exam  Physical Exam  Vitals and nursing note reviewed.   Constitutional:       General: She is awake.      Appearance: Normal appearance. She is well-developed and normal weight. She is not ill-appearing, toxic-appearing or diaphoretic.      Comments: /72 (BP Location:  Left arm)   Pulse 88   Temp 98.4 °F (36.9 °C) (Oral)   Resp 16   Wt 88.7 kg (195 lb 8.8 oz)   SpO2 98%   BMI 32.79 kg/m²      HENT:      Head: Normocephalic and atraumatic.      Jaw: There is normal jaw occlusion.      Right Ear: Hearing, tympanic membrane and external ear normal. No decreased hearing noted. No drainage, swelling or tenderness. No mastoid tenderness.      Left Ear: Hearing, tympanic membrane and external ear normal. No decreased hearing noted. No drainage, swelling or tenderness. No mastoid tenderness.      Nose: Nose normal.      Mouth/Throat:      Lips: Pink.      Mouth: Mucous membranes are moist.      Pharynx: Oropharynx is clear. Uvula midline.   Eyes:      General: Lids are normal. Vision grossly intact. Gaze aligned appropriately.         Right eye: No discharge.         Left eye: No discharge.      Extraocular Movements: Extraocular movements intact.      Conjunctiva/sclera: Conjunctivae normal.      Pupils: Pupils are equal, round, and reactive to light.   Neck:      Vascular: No JVD.      Trachea: Trachea and phonation normal. No tracheal tenderness or tracheal deviation.   Cardiovascular:      Rate and Rhythm: Normal rate and regular rhythm.      Pulses: Normal pulses.           Radial pulses are 2+ on the right side and 2+ on the left side.        Posterior tibial pulses are 2+ on the right side and 2+ on the left side.      Heart sounds: Normal heart sounds.   Pulmonary:      Effort: Pulmonary effort is normal.      Breath sounds: Normal breath sounds and air entry. No stridor. No decreased breath sounds, wheezing, rhonchi or rales.   Chest:      Chest wall: No tenderness.   Abdominal:      General: Abdomen is flat. Bowel sounds are normal. There is no distension.      Palpations: Abdomen is soft. Abdomen is not rigid.      Tenderness: There is abdominal tenderness in the suprapubic area. There is no right CVA tenderness, left CVA tenderness, guarding or rebound.    Musculoskeletal:         General: Normal range of motion.      Cervical back: Full passive range of motion without pain, normal range of motion and neck supple. No rigidity. No spinous process tenderness or muscular tenderness. Normal range of motion.   Feet:      Right foot:      Toenail Condition: Right toenails are normal.      Left foot:      Toenail Condition: Left toenails are normal.   Lymphadenopathy:      Head:      Right side of head: No submental, submandibular, tonsillar, preauricular, posterior auricular or occipital adenopathy.      Left side of head: No submental, submandibular, tonsillar, preauricular, posterior auricular or occipital adenopathy.      Cervical: No cervical adenopathy.      Right cervical: No superficial, deep or posterior cervical adenopathy.     Left cervical: No superficial, deep or posterior cervical adenopathy.   Skin:     General: Skin is warm.      Capillary Refill: Capillary refill takes less than 2 seconds.      Findings: No rash.   Neurological:      General: No focal deficit present.      Mental Status: She is alert and oriented to person, place, and time. Mental status is at baseline.      GCS: GCS eye subscore is 4. GCS verbal subscore is 5. GCS motor subscore is 6.      Sensory: No sensory deficit.      Deep Tendon Reflexes: Reflexes are normal and symmetric.      Reflex Scores:       Patellar reflexes are 2+ on the right side and 2+ on the left side.  Psychiatric:         Attention and Perception: Attention normal.         Mood and Affect: Mood normal.         Speech: Speech normal.         Behavior: Behavior normal. Behavior is cooperative.         Thought Content: Thought content normal.         Judgment: Judgment normal.         Vital Signs  ED Triage Vitals   Temperature Pulse Respirations Blood Pressure SpO2   02/21/24 1904 02/21/24 1904 02/21/24 1904 02/21/24 1904 02/21/24 1904   98.4 °F (36.9 °C) 88 16 112/72 98 %      Temp Source Heart Rate Source Patient  Position - Orthostatic VS BP Location FiO2 (%)   02/21/24 1900 -- 02/21/24 1904 02/21/24 1904 --   Oral  Lying Left arm       Pain Score       02/21/24 2019       6           Vitals:    02/21/24 1904   BP: 112/72   Pulse: 88   Patient Position - Orthostatic VS: Lying         Visual Acuity      ED Medications  Medications   sodium chloride 0.9 % bolus 1,000 mL (0 mL Intravenous Stopped 2/21/24 2225)   ketorolac (TORADOL) injection 15 mg (15 mg Intravenous Given 2/21/24 2019)   iohexol (OMNIPAQUE) 350 MG/ML injection (MULTI-DOSE) 100 mL (100 mL Intravenous Given 2/21/24 2108)   ciprofloxacin (CIPRO) tablet 500 mg (500 mg Oral Given 2/21/24 2225)   metroNIDAZOLE (FLAGYL) tablet 500 mg (500 mg Oral Given 2/21/24 2225)       Diagnostic Studies  Results Reviewed       Procedure Component Value Units Date/Time    Urine Microscopic [[id number]] Collected: 02/21/24 1939    Lab Status: Final result Specimen: Urine, Clean Catch Updated: 02/21/24 1959     RBC, UA 1-2 /hpf      WBC, UA None Seen /hpf      Epithelial Cells Occasional /hpf      Bacteria, UA None Seen /hpf      Amorphous Crystals, UA Occasional    UA w Reflex to Microscopic w Reflex to Culture [[id number]]  (Abnormal) Collected: 02/21/24 1939    Lab Status: Final result Specimen: Urine, Clean Catch Updated: 02/21/24 1953     Color, UA Light Yellow     Clarity, UA Turbid     Specific Gravity, UA 1.025     pH, UA 8.0     Leukocytes, UA Negative     Nitrite, UA Negative     Protein, UA Trace mg/dl      Glucose, UA Negative mg/dl      Ketones, UA Negative mg/dl      Urobilinogen, UA <2.0 mg/dl      Bilirubin, UA Negative     Occult Blood, UA Negative    Lipase [[id number]]  (Normal) Collected: 02/21/24 1914    Lab Status: Final result Specimen: Blood from Arm, Left Updated: 02/21/24 1942     Lipase 34 u/L     Comprehensive metabolic panel [[id number]] Collected: 02/21/24 1914    Lab Status: Final result Specimen: Blood from Arm, Left Updated: 02/21/24 1942     Sodium 140  mmol/L      Potassium 5.2 mmol/L      Chloride 107 mmol/L      CO2 27 mmol/L      ANION GAP 6 mmol/L      BUN 20 mg/dL      Creatinine 0.80 mg/dL      Glucose 114 mg/dL      Calcium 9.2 mg/dL      AST 28 U/L      ALT 20 U/L      Alkaline Phosphatase 101 U/L      Total Protein 7.3 g/dL      Albumin 4.3 g/dL      Total Bilirubin 0.30 mg/dL      eGFR 82 ml/min/1.73sq m     Narrative:      National Kidney Disease Foundation guidelines for Chronic Kidney Disease (CKD):     Stage 1 with normal or high GFR (GFR > 90 mL/min/1.73 square meters)    Stage 2 Mild CKD (GFR = 60-89 mL/min/1.73 square meters)    Stage 3A Moderate CKD (GFR = 45-59 mL/min/1.73 square meters)    Stage 3B Moderate CKD (GFR = 30-44 mL/min/1.73 square meters)    Stage 4 Severe CKD (GFR = 15-29 mL/min/1.73 square meters)    Stage 5 End Stage CKD (GFR <15 mL/min/1.73 square meters)  Note: GFR calculation is accurate only with a steady state creatinine    CBC and differential [[id number]] Collected: 02/21/24 1914    Lab Status: Final result Specimen: Blood from Arm, Left Updated: 02/21/24 1926     WBC 8.05 Thousand/uL      RBC 3.99 Million/uL      Hemoglobin 12.6 g/dL      Hematocrit 38.3 %      MCV 96 fL      MCH 31.6 pg      MCHC 32.9 g/dL      RDW 13.4 %      MPV 9.7 fL      Platelets 272 Thousands/uL      nRBC 0 /100 WBCs      Neutrophils Relative 62 %      Immat GRANS % 0 %      Lymphocytes Relative 27 %      Monocytes Relative 8 %      Eosinophils Relative 3 %      Basophils Relative 0 %      Neutrophils Absolute 4.92 Thousands/µL      Immature Grans Absolute 0.02 Thousand/uL      Lymphocytes Absolute 2.20 Thousands/µL      Monocytes Absolute 0.66 Thousand/µL      Eosinophils Absolute 0.22 Thousand/µL      Basophils Absolute 0.03 Thousands/µL                    CT abdomen pelvis with contrast   ED Interpretation by David Wolf PA-C (02/21 2210)   Reading Physician Reading Date Result Priority  Tejas Henley MD  [phone] 2/21/2024     Narrative  & Impression  CT ABDOMEN AND PELVIS WITH IV CONTRAST     INDICATION: suprapubic pain.     COMPARISON: None.     TECHNIQUE: CT examination of the abdomen and pelvis was performed. Multiplanar 2D reformatted images were created from the source data.     This examination, like all CT scans performed in the [facility], was performed utilizing techniques to minimize radiation dose exposure, including the use of iterative reconstruction and automated exposure control. Radiation dose length   product (DLP) for this visit: 814 mGy-cm     IV Contrast: 100 mL of iohexol (OMNIPAQUE)  Enteric Contrast: Not administered.     FINDINGS:     ABDOMEN     LOWER CHEST: No clinically significant abnormality in the visualized lower chest. Partially visualized bilateral lower chest breast implant prosthesis without evidence for extracapsular rupture.     LIVER/BILIARY TREE: Liver is norm   al in size and contour without enhancing mass. Multiple too small to characterize hepatic hypodensities noted.     GALLBLADDER: Contracted without wall thickening or calcified gallstones. No pericholecystic inflammatory change.     SPLEEN: Unremarkable.     PANCREAS: Unremarkable.     ADRENAL GLANDS: Unremarkable.     KIDNEYS/URETERS: Unremarkable. No hydronephrosis.     STOMACH AND BOWEL: Stomach is moderately distended with heterogeneous density debris. The small and large bowel loops appear normal in course and caliber without obstruction seen. Terminal ileum and appendix are unremarkable. Extensive descending and   sigmoid colon diverticulosis noted. Abnormal wall thickening and mild surrounding pelvic mesenteric inflammatory stranding involving the proximal sigmoid colon is seen without pericolonic free air or abscess/fluid collection. Findings suggest acute focal   colitis/diverticulitis. Cannot exclude neoplasm with superimposed infection. Recommend follow-up colonoscopy when c   linically appropriate.     APPENDIX: No findings  to suggest appendicitis.     ABDOMINOPELVIC CAVITY: Trace lower pelvic free fluid. No loculated fluid collections. No pneumoperitoneum. No lymphadenopathy by size criteria. Nonspecific subcentimeter mesenteric lymph nodes noted.     VESSELS: Unremarkable for patient's age.     PELVIS     REPRODUCTIVE ORGANS: Post hysterectomy. A few punctate left pelvic phleboliths.     URINARY BLADDER: Unremarkable.     ABDOMINAL WALL/INGUINAL REGIONS: Unremarkable.     BONES: No acute fracture or suspicious osseous lesion.     IMPRESSION:  Findings consistent with acute focal colitis/diverticulitis of the proximal sigmoid colon.  Cannot exclude neoplasm with superimposed infection. Recommend follow-up colonoscopy when clinically appropriate. Extensive descending and sigmoid diverticulosis.   Trace lower pelvic free fluid. No evidence for bowel obstruction, obstructive uropathy, appendicitis, free air or loculated fluid collections in the abdomen/pelvi   s.           Workstation performed: [id number]           Final Result by Tejas Henley MD (02/21 2149)   Findings consistent with acute focal colitis/diverticulitis of the proximal sigmoid colon.  Cannot exclude neoplasm with superimposed infection. Recommend follow-up colonoscopy when clinically appropriate. Extensive descending and sigmoid diverticulosis.    Trace lower pelvic free fluid. No evidence for bowel obstruction, obstructive uropathy, appendicitis, free air or loculated fluid collections in the abdomen/pelvis.            Workstation performed: [id number]                    Procedures  Procedures         ED Course  ED Course as of 02/22/24 0453   Wed Feb 21, 2024 1926 Sunday lower abdominal pain; suprapubic region- worsening and watery diarrhea                                SBIRT 20yo+      Flowsheet Row Most Recent Value   Initial Alcohol Screen: US AUDIT-C     1. How often do you have a drink containing alcohol? 0 Filed at: 02/21/2024 1916   2. How many drinks  "containing alcohol do you have on a typical day you are drinking?  0 Filed at: 02/21/2024 1916   3b. FEMALE Any Age, or MALE 65+: How often do you have 4 or more drinks on one occassion? 0 Filed at: 02/21/2024 1916   Audit-C Score 0 Filed at: 02/21/2024 1916   MARNIE: How many times in the past year have you...    Used an illegal drug or used a prescription medication for non-medical reasons? Never Filed at: 02/21/2024 1900                      Medical Decision Making    Patient is a 58-year-old female with a history of hysterectomy presents to the emergency department with dull aching persistent nonradiating lower abdominal pain for 3 days.    Patient hemodynamically stable and afebrile  No sirs  No leukocytosis, no bandemia  Normal electrolytes, normal kidney function  Urinalysis not indicative of urinary tract infection  Lipase negative, doubt acute pancreatitis  CT abdomen pelvis-impression-\"Findings consistent with acute focal colitis/diverticulitis of the proximal sigmoid colon.  Cannot exclude neoplasm with superimposed infection. Recommend follow-up colonoscopy when clinically appropriate. Extensive descending and sigmoid diverticulosis. Trace lower pelvic free fluid. No evidence for bowel obstruction, obstructive uropathy, appendicitis, free air or loculated fluid collections in the abdomen/pelvis.\"    Delivered toradol in the emergency department; patient demonstrates decrease in presenting lower abdominal pain ED symptomatology status post medication delivery  Ddx likely and not limited to cystitis, pyelonephritis, nephrolithiasis, ureteral stone, diverticulitis, colitis, small bowel obstruction, AAA,  Will treat for uncomplicated diverticulitis  Prescribed Cipro, Flagyl, and Zofran and counseled patient medication administration and side effects  Follow-up with GI  Follow-up with PCP  Follow up with emergency department if symptoms persist or exacerbate  Patient demonstrates verbal understanding of all " clinical laboratory and imaging findings, discharge instructions, follow-up, and verbally agrees with current treatment plan with teach back    *Due to voice recognition software, sound alike and misspelled words may be contained in the documentation*      Amount and/or Complexity of Data Reviewed  Labs: ordered. Decision-making details documented in ED Course.  Radiology: ordered and independent interpretation performed. Decision-making details documented in ED Course.    Risk  Prescription drug management.             Disposition  Final diagnoses:   Diverticulitis - Proximal sigmoid   Acute bilateral lower abdominal pain     Time reflects when diagnosis was documented in both MDM as applicable and the Disposition within this note       Time User Action Codes Description Comment    2/21/2024 10:12 PM David Wolf [K57.92] Diverticulitis     2/21/2024 10:12 PM David Wolf Modify [K57.92] Diverticulitis Proximal sigmoid    2/21/2024 10:17 PM David Wolf [R10.31,  R10.32] Acute bilateral lower abdominal pain           ED Disposition       ED Disposition   Discharge    Condition   Stable    Date/Time   Wed Feb 21, 2024 2216    Comment   Gretel Chowdhury discharge to home/self care.                   Follow-up Information       Follow up With Specialties Details Why Contact Info Additional Information    Zenon Mcleod MD Internal Medicine   [address] [place] [id number]  [phone]       [facility] Emergency Medicine   [address] [address]  [phone] [facility], [address], [place], [address]    [facility]  f/u after medication/antibiotics, for colonoscopy. [address]  [address]  [facility] [address] [phone] [facility] [place], [address] [facility]  [address], [place], [place], [address] [phone]            Discharge Medication List as of 2/21/2024 10:21 PM        START taking these medications    Details   ciprofloxacin (CIPRO) 500 mg tablet Take 1 tablet (500 mg total) by mouth 2 (two) times a day for 10 days, Starting Wed 2/21/2024, Until Sat 3/2/2024, Normal      metroNIDAZOLE (FLAGYL) 500 mg tablet Take 1 tablet (500 mg total) by mouth every 8 (eight) hours for 10 days, Starting Wed 2/21/2024, Until Sat 3/2/2024, Normal      ondansetron (ZOFRAN) 4 mg tablet Take 1 tablet (4 mg total) by mouth every 8 (eight) hours as needed for nausea or vomiting for up to 3 days, Starting Wed 2/21/2024, Until Sat 2/24/2024 at 2359, Normal           CONTINUE these medications which have NOT CHANGED    Details   levothyroxine 150 mcg tablet Take 150 mcg by mouth daily, Historical Med             No discharge procedures on file.    PDMP Review         Value Time User    PDMP Reviewed  Yes 2/22/2024  4:53 AM David Wolf PA-C            ED Provider  Electronically Signed by             David Wolf PA-C  02/22/24 [id number]

## 2025-01-07 ENCOUNTER — HOSPITAL ENCOUNTER (OUTPATIENT)
Dept: RADIOLOGY | Facility: HOSPITAL | Age: 60
Discharge: HOME | End: 2025-01-07
Payer: COMMERCIAL

## 2025-01-07 VITALS — HEIGHT: 66 IN | WEIGHT: 180 LBS | BODY MASS INDEX: 28.93 KG/M2

## 2025-01-07 DIAGNOSIS — Z78.0 MENOPAUSE: ICD-10-CM

## 2025-01-07 DIAGNOSIS — Z12.31 SCREENING MAMMOGRAM, ENCOUNTER FOR: ICD-10-CM

## 2025-01-07 PROCEDURE — 77067 SCR MAMMO BI INCL CAD: CPT

## 2025-01-07 PROCEDURE — 77080 DXA BONE DENSITY AXIAL: CPT

## 2025-01-07 PROCEDURE — 77063 BREAST TOMOSYNTHESIS BI: CPT

## 2025-01-30 ENCOUNTER — HOSPITAL ENCOUNTER (EMERGENCY)
Facility: HOSPITAL | Age: 60
Discharge: HOME | End: 2025-01-30
Attending: EMERGENCY MEDICINE
Payer: COMMERCIAL

## 2025-01-30 ENCOUNTER — APPOINTMENT (EMERGENCY)
Dept: RADIOLOGY | Facility: HOSPITAL | Age: 60
End: 2025-01-30
Payer: COMMERCIAL

## 2025-01-30 VITALS
SYSTOLIC BLOOD PRESSURE: 105 MMHG | DIASTOLIC BLOOD PRESSURE: 62 MMHG | HEART RATE: 67 BPM | OXYGEN SATURATION: 100 % | RESPIRATION RATE: 18 BRPM | TEMPERATURE: 98 F

## 2025-01-30 DIAGNOSIS — M94.0 COSTOCHONDRITIS: ICD-10-CM

## 2025-01-30 DIAGNOSIS — R07.9 CHEST PAIN: Primary | ICD-10-CM

## 2025-01-30 LAB
ALBUMIN SERPL BCG-MCNC: 4.5 G/DL (ref 3.5–5)
ALP SERPL-CCNC: 115 U/L (ref 34–104)
ALT SERPL W P-5'-P-CCNC: 25 U/L (ref 7–52)
ANION GAP SERPL CALCULATED.3IONS-SCNC: 9 MMOL/L (ref 4–13)
AST SERPL W P-5'-P-CCNC: 24 U/L (ref 13–39)
BASOPHILS # BLD AUTO: 0.05 THOUSANDS/ΜL (ref 0–0.1)
BASOPHILS NFR BLD AUTO: 1 % (ref 0–1)
BILIRUB SERPL-MCNC: 0.27 MG/DL (ref 0.2–1)
BUN SERPL-MCNC: 15 MG/DL (ref 5–25)
CALCIUM SERPL-MCNC: 9.3 MG/DL (ref 8.4–10.2)
CARDIAC TROPONIN I PNL SERPL HS: <2 NG/L (ref ?–50)
CHLORIDE SERPL-SCNC: 108 MMOL/L (ref 96–108)
CO2 SERPL-SCNC: 23 MMOL/L (ref 21–32)
CREAT SERPL-MCNC: 0.85 MG/DL (ref 0.6–1.3)
EOSINOPHIL # BLD AUTO: 0.27 THOUSAND/ΜL (ref 0–0.61)
EOSINOPHIL NFR BLD AUTO: 4 % (ref 0–6)
ERYTHROCYTE [DISTWIDTH] IN BLOOD BY AUTOMATED COUNT: 13.3 % (ref 11.6–15.1)
GFR SERPL CREATININE-BSD FRML MDRD: 75 ML/MIN/1.73SQ M
GLUCOSE SERPL-MCNC: 109 MG/DL (ref 65–140)
HCT VFR BLD AUTO: 40.9 % (ref 34.8–46.1)
HGB BLD-MCNC: 13.3 G/DL (ref 11.5–15.4)
IMM GRANULOCYTES # BLD AUTO: 0.01 THOUSAND/UL (ref 0–0.2)
IMM GRANULOCYTES NFR BLD AUTO: 0 % (ref 0–2)
LYMPHOCYTES # BLD AUTO: 3.03 THOUSANDS/ΜL (ref 0.6–4.47)
LYMPHOCYTES NFR BLD AUTO: 44 % (ref 14–44)
MCH RBC QN AUTO: 31.7 PG (ref 26.8–34.3)
MCHC RBC AUTO-ENTMCNC: 32.5 G/DL (ref 31.4–37.4)
MCV RBC AUTO: 97 FL (ref 82–98)
MONOCYTES # BLD AUTO: 0.48 THOUSAND/ΜL (ref 0.17–1.22)
MONOCYTES NFR BLD AUTO: 7 % (ref 4–12)
NEUTROPHILS # BLD AUTO: 3.08 THOUSANDS/ΜL (ref 1.85–7.62)
NEUTS SEG NFR BLD AUTO: 44 % (ref 43–75)
NRBC BLD AUTO-RTO: 0 /100 WBCS
PLATELET # BLD AUTO: 278 THOUSANDS/UL (ref 149–390)
PMV BLD AUTO: 9.7 FL (ref 8.9–12.7)
POTASSIUM SERPL-SCNC: 4.1 MMOL/L (ref 3.5–5.3)
PROT SERPL-MCNC: 7.6 G/DL (ref 6.4–8.4)
RBC # BLD AUTO: 4.2 MILLION/UL (ref 3.81–5.12)
SODIUM SERPL-SCNC: 140 MMOL/L (ref 135–147)
WBC # BLD AUTO: 6.92 THOUSAND/UL (ref 4.31–10.16)

## 2025-01-30 PROCEDURE — 93005 ELECTROCARDIOGRAM TRACING: CPT

## 2025-01-30 PROCEDURE — 99285 EMERGENCY DEPT VISIT HI MDM: CPT

## 2025-01-30 PROCEDURE — 84484 ASSAY OF TROPONIN QUANT: CPT | Performed by: EMERGENCY MEDICINE

## 2025-01-30 PROCEDURE — 80053 COMPREHEN METABOLIC PANEL: CPT | Performed by: EMERGENCY MEDICINE

## 2025-01-30 PROCEDURE — 71045 X-RAY EXAM CHEST 1 VIEW: CPT

## 2025-01-30 PROCEDURE — 99285 EMERGENCY DEPT VISIT HI MDM: CPT | Performed by: EMERGENCY MEDICINE

## 2025-01-30 PROCEDURE — 36415 COLL VENOUS BLD VENIPUNCTURE: CPT

## 2025-01-30 PROCEDURE — 96374 THER/PROPH/DIAG INJ IV PUSH: CPT

## 2025-01-30 PROCEDURE — 85025 COMPLETE CBC W/AUTO DIFF WBC: CPT | Performed by: EMERGENCY MEDICINE

## 2025-01-30 RX ORDER — OXYCODONE HYDROCHLORIDE 5 MG/1
5 TABLET ORAL ONCE
Refills: 0 | Status: COMPLETED | OUTPATIENT
Start: 2025-01-30 | End: 2025-01-30

## 2025-01-30 RX ORDER — KETOROLAC TROMETHAMINE 30 MG/ML
15 INJECTION, SOLUTION INTRAMUSCULAR; INTRAVENOUS ONCE
Status: COMPLETED | OUTPATIENT
Start: 2025-01-30 | End: 2025-01-30

## 2025-01-30 RX ORDER — FAMOTIDINE 20 MG/1
20 TABLET, FILM COATED ORAL ONCE
Status: COMPLETED | OUTPATIENT
Start: 2025-01-30 | End: 2025-01-30

## 2025-01-30 RX ADMIN — FAMOTIDINE 20 MG: 20 TABLET, FILM COATED ORAL at 23:22

## 2025-01-30 RX ADMIN — KETOROLAC TROMETHAMINE 15 MG: 30 INJECTION, SOLUTION INTRAMUSCULAR; INTRAVENOUS at 22:09

## 2025-01-30 RX ADMIN — OXYCODONE HYDROCHLORIDE 5 MG: 5 TABLET ORAL at 23:23

## 2025-01-31 NOTE — DISCHARGE INSTRUCTIONS
Follow-up as scheduled for repeat mammogram - please return to the ED as needed for new/worsening symptoms.

## 2025-01-31 NOTE — ED PROVIDER NOTES
Time reflects when diagnosis was documented in both MDM as applicable and the Disposition within this note       Time User Action Codes Description Comment    1/30/2025 11:09 PM Arnold Palafox Add [R07.9] Chest pain     1/30/2025 11:16 PM Arnold Palafox Add [M94.0] Costochondritis     1/30/2025 11:16 PM Arnold Palafox Modify [M94.0] Costochondritis possible          ED Disposition       ED Disposition   Discharge    Condition   Stable    Date/Time   u Jan 30, 2025 11:09 PM    Comment   Gretel Jaime discharge to home/self care.                   Assessment & Plan       Medical Decision Making  DDX including but not limited to: ACS, MI, PE, PTX, pneumonia, dissection, pleurisy, pericarditis, myocarditis, rhabdomyolysis, GI etiology.    Pt is a 58 y/o female presenting to the ED with left parasternal chest pain beginning several months ago but acutely worsening the past 4 days. Pain radiates to jaw and left arm as well. Recent mammogram was described as abnormal and has repeat mammography in early February. Pain not worsened with exertion. No dyspnea, focal weakness, rash, fevers, n/v/d. She has been working out more recently as well, increased physical activity. No precipitous weight loss. No dimpling/inflammatory changes of breath.     Troponin <2, pain began 4 days. Hemodynamically stable, no acute distress. Only mild improvement with toradol. Close f/u with repeat mammography, she is agreeable to return precautions and follow-up. Low suspicion for acs/PE/dissection/ptx/inflammatory breast cancer. Favor costochondritis, inflammation.     Amount and/or Complexity of Data Reviewed  Labs: ordered. Decision-making details documented in ED Course.  Radiology: ordered and independent interpretation performed.    Risk  Prescription drug management.        ED Course as of 01/30/25 2352   Thu Jan 30, 2025   2205 hs TnI 0hr: <2       Medications   ketorolac (TORADOL) injection 15 mg (15 mg Intravenous  Given 1/30/25 2209)   oxyCODONE (ROXICODONE) IR tablet 5 mg (5 mg Oral Given 1/30/25 2323)   famotidine (PEPCID) tablet 20 mg (20 mg Oral Given 1/30/25 2322)       ED Risk Strat Scores   HEART Risk Score      Flowsheet Row Most Recent Value   Heart Score Risk Calculator    History 0 Filed at: 01/30/2025 2333   ECG 0 Filed at: 01/30/2025 2333   Age 1 Filed at: 01/30/2025 2333   Risk Factors 1 Filed at: 01/30/2025 2333   Troponin 0 Filed at: 01/30/2025 2333   HEART Score 2 Filed at: 01/30/2025 2333          HEART Risk Score      Flowsheet Row Most Recent Value   Heart Score Risk Calculator    History 0 Filed at: 01/30/2025 2333   ECG 0 Filed at: 01/30/2025 2333   Age 1 Filed at: 01/30/2025 2333   Risk Factors 1 Filed at: 01/30/2025 2333   Troponin 0 Filed at: 01/30/2025 2333   HEART Score 2 Filed at: 01/30/2025 2333                            SBIRT 20yo+      Flowsheet Row Most Recent Value   Initial Alcohol Screen: US AUDIT-C     1. How often do you have a drink containing alcohol? 0 Filed at: 01/30/2025 2100   2. How many drinks containing alcohol do you have on a typical day you are drinking?  0 Filed at: 01/30/2025 2100   3a. Male UNDER 65: How often do you have five or more drinks on one occasion? 0 Filed at: 01/30/2025 2100   3b. FEMALE Any Age, or MALE 65+: How often do you have 4 or more drinks on one occassion? 0 Filed at: 01/30/2025 2100   Audit-C Score 0 Filed at: 01/30/2025 2100   MARNIE: How many times in the past year have you...    Used an illegal drug or used a prescription medication for non-medical reasons? Never Filed at: 01/30/2025 2100                            History of Present Illness       Chief Complaint   Patient presents with    Chest Pain     Left sided CP that feels like throbbing that radiates up neck and down left arm for about 5 days. Took tylenol prior to arrival with no relief.        Past Medical History:   Diagnosis Date    Disease of thyroid gland       Past Surgical History:    Procedure Laterality Date    AUGMENTATION MAMMAPLASTY Bilateral 2007    saline    COSMETIC SURGERY      Breast implants    HERNIA REPAIR      HYSTERECTOMY      partial     NECK SURGERY        Family History   Problem Relation Age of Onset    Colon cancer Father 73    No Known Problems Mother     No Known Problems Sister     No Known Problems Daughter     Pancreatic cancer Maternal Grandmother     No Known Problems Daughter     No Known Problems Maternal Aunt     No Known Problems Paternal Aunt     No Known Problems Paternal Uncle       Social History     Tobacco Use    Smoking status: Never    Smokeless tobacco: Never   Substance Use Topics    Alcohol use: Never    Drug use: Never      E-Cigarette/Vaping      E-Cigarette/Vaping Substances      I have reviewed and agree with the history as documented.     Pt is a 60 y/o female presenting to the ED with left parasternal chest pain beginning several months ago but acutely worsening the past 4 days. Pain radiates to jaw and left arm as well. Recent mammogram was described as abnormal and has repeat mammography in early February. Pain not worsened with exertion. No dyspnea, focal weakness, rash, fevers, n/v/d. She has been working out more recently as well, increased physical activity. No precipitous weight loss. No dimpling/inflammatory changes of breath.           Review of Systems   Respiratory:  Positive for chest tightness.    Cardiovascular:  Positive for chest pain.   All other systems reviewed and are negative.          Objective       ED Triage Vitals   Temperature Pulse Blood Pressure Respirations SpO2 Patient Position - Orthostatic VS   01/30/25 2100 01/30/25 2059 01/30/25 2059 01/30/25 2059 01/30/25 2059 --   98 °F (36.7 °C) 81 138/75 18 100 %       Temp Source Heart Rate Source BP Location FiO2 (%) Pain Score    01/30/25 2100 01/30/25 2059 01/30/25 2059 -- 01/30/25 2209    Oral Monitor Right arm  8      Vitals      Date and Time Temp Pulse SpO2 Resp BP  Pain Score FACES Pain Rating User   01/30/25 2323 -- -- -- -- -- 8 -- RC   01/30/25 2319 -- 67 100 % 18 105/62 -- -- RC   01/30/25 2209 -- -- -- -- -- 8 -- RC   01/30/25 2100 98 °F (36.7 °C) -- -- -- -- -- -- HAO   01/30/25 2059 -- 81 100 % 18 138/75 -- -- HAO            Physical Exam  Vitals and nursing note reviewed.   Constitutional:       General: She is not in acute distress.     Appearance: Normal appearance. She is not ill-appearing, toxic-appearing or diaphoretic.   HENT:      Head: Normocephalic and atraumatic.      Nose: Nose normal. No congestion or rhinorrhea.      Mouth/Throat:      Mouth: Mucous membranes are moist.      Pharynx: Oropharynx is clear. No oropharyngeal exudate or posterior oropharyngeal erythema.   Eyes:      General: No scleral icterus.        Right eye: No discharge.         Left eye: No discharge.      Extraocular Movements: Extraocular movements intact.      Conjunctiva/sclera: Conjunctivae normal.      Pupils: Pupils are equal, round, and reactive to light.   Neck:      Vascular: No carotid bruit or JVD.      Trachea: No tracheal deviation.   Cardiovascular:      Rate and Rhythm: Normal rate and regular rhythm. No extrasystoles are present.     Chest Wall: PMI is not displaced.      Pulses: Normal pulses.           Carotid pulses are 2+ on the right side and 2+ on the left side.       Radial pulses are 2+ on the right side and 2+ on the left side.        Dorsalis pedis pulses are 2+ on the right side and 2+ on the left side.        Posterior tibial pulses are 2+ on the right side and 2+ on the left side.      Heart sounds: Normal heart sounds. Heart sounds not distant. No murmur heard.     No friction rub. No gallop.   Pulmonary:      Effort: Pulmonary effort is normal. No tachypnea, accessory muscle usage or respiratory distress.      Breath sounds: Normal breath sounds. No stridor. No decreased breath sounds, wheezing, rhonchi or rales.   Chest:      Chest wall: No mass, deformity,  tenderness, crepitus or edema. There is no dullness to percussion.   Abdominal:      General: Abdomen is flat. Bowel sounds are normal. There is no distension or abdominal bruit.      Palpations: Abdomen is soft. There is no fluid wave, hepatomegaly, splenomegaly or mass.      Tenderness: There is no abdominal tenderness. There is no right CVA tenderness, left CVA tenderness, guarding or rebound.      Hernia: No hernia is present.   Musculoskeletal:         General: No swelling, tenderness, deformity or signs of injury. Normal range of motion.      Cervical back: Normal range of motion and neck supple. No rigidity or tenderness.      Right lower leg: No tenderness. No edema.      Left lower leg: No tenderness. No edema.   Lymphadenopathy:      Cervical: No cervical adenopathy.   Skin:     General: Skin is warm and dry.      Coloration: Skin is not cyanotic, jaundiced or pale.      Findings: No bruising, ecchymosis, erythema, lesion or rash.      Nails: There is no clubbing.   Neurological:      General: No focal deficit present.      Mental Status: She is alert and oriented to person, place, and time.      Cranial Nerves: No cranial nerve deficit.      Sensory: No sensory deficit.      Motor: No weakness.      Coordination: Coordination normal.      Gait: Gait normal.      Deep Tendon Reflexes: Reflexes normal.   Psychiatric:         Mood and Affect: Mood normal. Mood is not anxious.         Behavior: Behavior normal. Behavior is not agitated.         Results Reviewed       Procedure Component Value Units Date/Time    HS Troponin 0hr (reflex protocol) [[id number]]  (Normal) Collected: 01/30/25 2101    Lab Status: Final result Specimen: Blood from Arm, Left Updated: 01/30/25 2205     hs TnI 0hr <2 ng/L     Comprehensive metabolic panel [[id number]]  (Abnormal) Collected: 01/30/25 2101    Lab Status: Final result Specimen: Blood from Arm, Left Updated: 01/30/25 2157     Sodium 140 mmol/L      Potassium 4.1 mmol/L       Chloride 108 mmol/L      CO2 23 mmol/L      ANION GAP 9 mmol/L      BUN 15 mg/dL      Creatinine 0.85 mg/dL      Glucose 109 mg/dL      Calcium 9.3 mg/dL      AST 24 U/L      ALT 25 U/L      Alkaline Phosphatase 115 U/L      Total Protein 7.6 g/dL      Albumin 4.5 g/dL      Total Bilirubin 0.27 mg/dL      eGFR 75 ml/min/1.73sq m     Narrative:      National Kidney Disease Foundation guidelines for Chronic Kidney Disease (CKD):     Stage 1 with normal or high GFR (GFR > 90 mL/min/1.73 square meters)    Stage 2 Mild CKD (GFR = 60-89 mL/min/1.73 square meters)    Stage 3A Moderate CKD (GFR = 45-59 mL/min/1.73 square meters)    Stage 3B Moderate CKD (GFR = 30-44 mL/min/1.73 square meters)    Stage 4 Severe CKD (GFR = 15-29 mL/min/1.73 square meters)    Stage 5 End Stage CKD (GFR <15 mL/min/1.73 square meters)  Note: GFR calculation is accurate only with a steady state creatinine    CBC and differential [[id number]] Collected: 01/30/25 2101    Lab Status: Final result Specimen: Blood from Arm, Left Updated: 01/30/25 2131     WBC 6.92 Thousand/uL      RBC 4.20 Million/uL      Hemoglobin 13.3 g/dL      Hematocrit 40.9 %      MCV 97 fL      MCH 31.7 pg      MCHC 32.5 g/dL      RDW 13.3 %      MPV 9.7 fL      Platelets 278 Thousands/uL      nRBC 0 /100 WBCs      Segmented % 44 %      Immature Grans % 0 %      Lymphocytes % 44 %      Monocytes % 7 %      Eosinophils Relative 4 %      Basophils Relative 1 %      Absolute Neutrophils 3.08 Thousands/µL      Absolute Immature Grans 0.01 Thousand/uL      Absolute Lymphocytes 3.03 Thousands/µL      Absolute Monocytes 0.48 Thousand/µL      Eosinophils Absolute 0.27 Thousand/µL      Basophils Absolute 0.05 Thousands/µL             XR chest 1 view portable   ED Interpretation by Arnold Palafox DO (01/30 7801)   No acute cardiopulmonary disease.          ECG 12 Lead Documentation Only    Date/Time: 1/30/2025 9:01 PM    Performed by: Arnold Palafox DO  Authorized  by: Arnold Palafox DO    Indications / Diagnosis:  Chest pain  ECG reviewed by me, the ED Provider: yes    Patient location:  ED  Previous ECG:     Previous ECG:  Unavailable    Comparison to cardiac monitor: Yes    Interpretation:     Interpretation: abnormal    Rate:     ECG rate:  78    ECG rate assessment: normal    Rhythm:     Rhythm: sinus rhythm    Ectopy:     Ectopy: none    QRS:     QRS axis:  Normal    QRS intervals:  Normal  Conduction:     Conduction: abnormal    ST segments:     ST segments:  Non-specific  T waves:     T waves: normal    Comments:      No STEMI. No comparison EKG.      ED Medication and Procedure Management   Prior to Admission Medications   Prescriptions Last Dose Informant Patient Reported? Taking?   levothyroxine 150 mcg tablet   Yes No   Sig: Take 150 mcg by mouth daily   ondansetron (ZOFRAN) 4 mg tablet   No No   Sig: Take 1 tablet (4 mg total) by mouth every 8 (eight) hours as needed for nausea or vomiting for up to 3 days      Facility-Administered Medications: None     Discharge Medication List as of 1/30/2025 11:17 PM        CONTINUE these medications which have NOT CHANGED    Details   levothyroxine 150 mcg tablet Take 150 mcg by mouth daily, Historical Med      ondansetron (ZOFRAN) 4 mg tablet Take 1 tablet (4 mg total) by mouth every 8 (eight) hours as needed for nausea or vomiting for up to 3 days, Starting Wed 2/21/2024, Until Sat 2/24/2024 at 2359, Normal           No discharge procedures on file.  ED SEPSIS DOCUMENTATION   Time reflects when diagnosis was documented in both MDM as applicable and the Disposition within this note       Time User Action Codes Description Comment    1/30/2025 11:09 PM Arnold Palafox Add [R07.9] Chest pain     1/30/2025 11:16 PM Arnold Palafox Add [M94.0] Costochondritis     1/30/2025 11:16 PM Arnold Palafox Modify [M94.0] Costochondritis possible                 Arnold Palafox DO  01/30/25 1124

## 2025-01-31 NOTE — ED ATTENDING ATTESTATION
1/30/2025  IYou DO, saw and evaluated the patient. I have discussed the patient with the resident/non-physician practitioner and agree with the resident's/non-physician practitioner's findings, Plan of Care, and MDM as documented in the resident's/non-physician practitioner's note, except where noted. All available labs and Radiology studies were reviewed.  I was present for key portions of any procedure(s) performed by the resident/non-physician practitioner and I was immediately available to provide assistance.       At this point I agree with the current assessment done in the Emergency Department.  I have conducted an independent evaluation of this patient a history and physical is as follows:          1. Chest pain    2. Costochondritis            MDM  Number of Diagnoses or Management Options  Chest pain  Costochondritis  Diagnosis management comments:       Initial ED assessment:   59-year-old female, chest pain, reproducible on exam with palpation of the chest wall specifically left costosternal border.    Pathology at risk for includes but is not limited to:   Costochondritis, pneumonia, pneumothorax, ACS felt to be less likely.    Initial ED plan:   Chest x-ray blood work troponin, delta troponin if elevated, will check single troponin if negative as symptoms have been for weeks.        Final ED summary/disposition:   After evaluation and workup in the emergency department, workup negative, patient discharged, felt to be costochondritis recommend anti-inflammatories patient follow PCP.               Time reflects when diagnosis was documented in both MDM as applicable and the Disposition within this note       Time User Action Codes Description Comment    1/30/2025 11:09 PM Arnold Palafox Add [R07.9] Chest pain     1/30/2025 11:16 PM Arnold Palafox Add [M94.0] Costochondritis     1/30/2025 11:16 PM Arnold Palafox Modify [M94.0] Costochondritis possible          ED  Disposition       ED Disposition   Discharge    Condition   Stable    Date/Time   Thu Jan 30, 2025 11:09 PM    Comment   Gretel Chowdhury discharge to home/self care.                   Follow-up Information       Follow up With Specialties Details Why Contact Info Additional Information     [facility] Emergency Department Emergency Medicine  As needed [address] [facility] [address]  [phone] [facility] Emergency Department, [address], [place], [address]    Zenon Mcleod MD Internal Medicine In 1 week As needed [address] [facility] [id number] [phone]                             Chief Complaint   Patient presents with    Chest Pain     Left sided CP that feels like throbbing that radiates up neck and down left arm for about 5 days. Took tylenol prior to arrival with no relief.              59-year-old female presents with left-sided chest pain.,  Worse with using her left upper extremity, has been for the past 3 weeks, particularly over the past couple of days which is what prompted ED visit.,  Prior  to this patient recently started taking a Pilates class where she has been doing a lot of resistance band exercises with her upper body the, this is new activity for her.  No associated shortness with, no falls or direct trauma.  No overlying skin rashes.      Chest Pain                Visit Vitals  /62   Pulse 67   Temp 98 °F (36.7 °C) (Oral)   Resp 18   SpO2 100%   OB Status Postmenopausal   Smoking Status Never        Physical Exam  Vitals reviewed.   Constitutional:       General: She is not in acute distress.     Appearance: She is well-developed. She is not diaphoretic.   HENT:      Head: Normocephalic and atraumatic.      Right Ear: External ear normal.      Left Ear: External ear normal.      Nose: Nose normal.   Eyes:      General:         Right eye: No discharge.         Left eye: No discharge.       Pupils: Pupils are equal, round, and reactive to light.   Neck:      Trachea: No tracheal deviation.   Cardiovascular:      Rate and Rhythm: Normal rate and regular rhythm.      Heart sounds: Normal heart sounds. No murmur heard.  Pulmonary:      Effort: Pulmonary effort is normal. No respiratory distress.      Breath sounds: Normal breath sounds. No stridor.   Chest:      Chest wall: Tenderness (Palpation of left chest wall fully reproduces patient's symptoms) present.   Abdominal:      General: There is no distension.      Palpations: Abdomen is soft.      Tenderness: There is no abdominal tenderness. There is no guarding or rebound.   Musculoskeletal:         General: Normal range of motion.      Cervical back: Normal range of motion and neck supple.   Skin:     General: Skin is warm and dry.      Coloration: Skin is not pale.      Findings: No erythema.   Neurological:      General: No focal deficit present.      Mental Status: She is alert and oriented to person, place, and time.                       Medications   ketorolac (TORADOL) injection 15 mg (15 mg Intravenous Given 1/30/25 2209)   oxyCODONE (ROXICODONE) IR tablet 5 mg (5 mg Oral Given 1/30/25 2323)   famotidine (PEPCID) tablet 20 mg (20 mg Oral Given 1/30/25 2322)             Labs Reviewed   COMPREHENSIVE METABOLIC PANEL - Abnormal       Result Value Ref Range Status    Sodium 140  135 - 147 mmol/L Final    Potassium 4.1  3.5 - 5.3 mmol/L Final    Comment: Slightly Hemolyzed:Results may be affected.    Chloride 108  96 - 108 mmol/L Final    CO2 23  21 - 32 mmol/L Final    ANION GAP 9  4 - 13 mmol/L Final    BUN 15  5 - 25 mg/dL Final    Creatinine 0.85  0.60 - 1.30 mg/dL Final    Comment: Standardized to IDMS reference method    Glucose 109  65 - 140 mg/dL Final    Comment: If the patient is fasting, the ADA then defines impaired fasting glucose as > 100 mg/dL and diabetes as > or equal to 123 mg/dL.    Calcium 9.3  8.4 - 10.2 mg/dL Final    AST 24   "13 - 39 U/L Final    Comment: Slightly Hemolyzed:Results may be affected.    ALT 25  7 - 52 U/L Final    Comment: Specimen collection should occur prior to Sulfasalazine administration due to the potential for falsely depressed results.     Alkaline Phosphatase 115 (*) 34 - 104 U/L Final    Total Protein 7.6  6.4 - 8.4 g/dL Final    Albumin 4.5  3.5 - 5.0 g/dL Final    Total Bilirubin 0.27  0.20 - 1.00 mg/dL Final    Comment: Use of this assay is not recommended for patients undergoing treatment with eltrombopag due to the potential for falsely elevated results.  N-acetyl-p-benzoquinone imine (metabolite of Acetaminophen) will generate erroneously low results in samples for patients that have taken an overdose of Acetaminophen.    eGFR 75  ml/min/1.73sq m Final    Narrative:     National Kidney Disease Foundation guidelines for Chronic Kidney Disease (CKD):     Stage 1 with normal or high GFR (GFR > 90 mL/min/1.73 square meters)    Stage 2 Mild CKD (GFR = 60-89 mL/min/1.73 square meters)    Stage 3A Moderate CKD (GFR = 45-59 mL/min/1.73 square meters)    Stage 3B Moderate CKD (GFR = 30-44 mL/min/1.73 square meters)    Stage 4 Severe CKD (GFR = 15-29 mL/min/1.73 square meters)    Stage 5 End Stage CKD (GFR <15 mL/min/1.73 square meters)  Note: GFR calculation is accurate only with a steady state creatinine   HS TROPONIN I 0HR - Normal    hs TnI 0hr <2  \"Refer to ACS Flowchart\"- see link ng/L Final    Comment:                                              Initial (time 0) result  If >=50 ng/L, Myocardial injury suggested ;  Type of myocardial injury and treatment strategy  to be determined.  If 5-49 ng/L, a delta result at 2 hours and or 4 hours will be needed to further evaluate.  If <4 ng/L, and chest pain has been >3 hours since onset, patient may qualify for discharge based on the HEART score in the ED.  If <5 ng/L and <3hours since onset of chest pain, a delta result at 2 hours will be needed to further " evaluate.    HS Troponin 99th Percentile URL of a Health Population=12 ng/L with a 95% Confidence Interval of 8-18 ng/L.    Second Troponin (time 2 hours)  If calculated delta >= 20 ng/L,  Myocardial injury suggested ; Type of myocardial injury and treatment strategy to be determined.  If 5-49 ng/L and the calculated delta is 5-19 ng/L, consult medical service for evaluation.  Continue evaluation for ischemia on ecg and other possible etiology and repeat hs troponin at 4 hours.  If delta is <5 ng/L at 2 hours, consider discharge based on risk stratification via the HEART score (if in ED), or ESTEBAN risk score in IP/Observation.    HS Troponin 99th Percentile URL of a Health Population=12 ng/L with a 95% Confidence Interval of 8-18 ng/L.   CBC AND DIFFERENTIAL    WBC 6.92  4.31 - 10.16 Thousand/uL Final    RBC 4.20  3.81 - 5.12 Million/uL Final    Hemoglobin 13.3  11.5 - 15.4 g/dL Final    Hematocrit 40.9  34.8 - 46.1 % Final    MCV 97  82 - 98 fL Final    MCH 31.7  26.8 - 34.3 pg Final    MCHC 32.5  31.4 - 37.4 g/dL Final    RDW 13.3  11.6 - 15.1 % Final    MPV 9.7  8.9 - 12.7 fL Final    Platelets 278  149 - 390 Thousands/uL Final    nRBC 0  /100 WBCs Final    Segmented % 44  43 - 75 % Final    Immature Grans % 0  0 - 2 % Final    Lymphocytes % 44  14 - 44 % Final    Monocytes % 7  4 - 12 % Final    Eosinophils Relative 4  0 - 6 % Final    Basophils Relative 1  0 - 1 % Final    Absolute Neutrophils 3.08  1.85 - 7.62 Thousands/µL Final    Absolute Immature Grans 0.01  0.00 - 0.20 Thousand/uL Final    Absolute Lymphocytes 3.03  0.60 - 4.47 Thousands/µL Final    Absolute Monocytes 0.48  0.17 - 1.22 Thousand/µL Final    Eosinophils Absolute 0.27  0.00 - 0.61 Thousand/µL Final    Basophils Absolute 0.05  0.00 - 0.10 Thousands/µL Final   LIGHT BLUE TOP         XR chest 1 view portable    (Results Pending)                  Procedures

## 2025-02-01 LAB
ATRIAL RATE: 78 BPM
P AXIS: 71 DEGREES
PR INTERVAL: 180 MS
QRS AXIS: -19 DEGREES
QRSD INTERVAL: 86 MS
QT INTERVAL: 366 MS
QTC INTERVAL: 417 MS
T WAVE AXIS: 18 DEGREES
VENTRICULAR RATE: 78 BPM

## 2025-02-01 PROCEDURE — 93010 ELECTROCARDIOGRAM REPORT: CPT | Performed by: INTERNAL MEDICINE

## 2025-02-13 ENCOUNTER — HOSPITAL ENCOUNTER (OUTPATIENT)
Dept: RADIOLOGY | Facility: HOSPITAL | Age: 60
Discharge: HOME | End: 2025-02-13
Payer: COMMERCIAL

## 2025-02-13 DIAGNOSIS — R92.8 ABNORMAL MAMMOGRAM: ICD-10-CM

## 2025-02-13 PROCEDURE — 76642 ULTRASOUND BREAST LIMITED: CPT

## 2025-02-13 PROCEDURE — 77065 DX MAMMO INCL CAD UNI: CPT

## 2025-02-13 PROCEDURE — G0279 TOMOSYNTHESIS, MAMMO: HCPCS

## 2025-04-23 ENCOUNTER — HOSPITAL ENCOUNTER (EMERGENCY)
Facility: HOSPITAL | Age: 60
Discharge: HOME | End: 2025-04-23
Attending: EMERGENCY MEDICINE
Payer: COMMERCIAL

## 2025-04-23 ENCOUNTER — APPOINTMENT (EMERGENCY)
Dept: CT IMAGING | Facility: HOSPITAL | Age: 60
End: 2025-04-23
Payer: COMMERCIAL

## 2025-04-23 VITALS
DIASTOLIC BLOOD PRESSURE: 61 MMHG | OXYGEN SATURATION: 98 % | HEART RATE: 91 BPM | SYSTOLIC BLOOD PRESSURE: 127 MMHG | RESPIRATION RATE: 18 BRPM | TEMPERATURE: 98.5 F

## 2025-04-23 DIAGNOSIS — K57.92 DIVERTICULITIS: Primary | ICD-10-CM

## 2025-04-23 LAB
ALBUMIN SERPL BCG-MCNC: 4.5 G/DL (ref 3.5–5)
ALP SERPL-CCNC: 125 U/L (ref 34–104)
ALT SERPL W P-5'-P-CCNC: 36 U/L (ref 7–52)
ANION GAP SERPL CALCULATED.3IONS-SCNC: 9 MMOL/L (ref 4–13)
AST SERPL W P-5'-P-CCNC: 42 U/L (ref 13–39)
BACTERIA UR QL AUTO: NORMAL /HPF
BASOPHILS # BLD AUTO: 0.02 THOUSANDS/ÂΜL (ref 0–0.1)
BASOPHILS NFR BLD AUTO: 0 % (ref 0–1)
BILIRUB SERPL-MCNC: 0.37 MG/DL (ref 0.2–1)
BILIRUB UR QL STRIP: NEGATIVE
BUN SERPL-MCNC: 16 MG/DL (ref 5–25)
CALCIUM SERPL-MCNC: 9.5 MG/DL (ref 8.4–10.2)
CHLORIDE SERPL-SCNC: 105 MMOL/L (ref 96–108)
CLARITY UR: CLEAR
CO2 SERPL-SCNC: 23 MMOL/L (ref 21–32)
COLOR UR: ABNORMAL
CREAT SERPL-MCNC: 0.64 MG/DL (ref 0.6–1.3)
EOSINOPHIL # BLD AUTO: 0.23 THOUSAND/ÂΜL (ref 0–0.61)
EOSINOPHIL NFR BLD AUTO: 3 % (ref 0–6)
ERYTHROCYTE [DISTWIDTH] IN BLOOD BY AUTOMATED COUNT: 13.9 % (ref 11.6–15.1)
FLUAV AG UPPER RESP QL IA.RAPID: NEGATIVE
FLUBV AG UPPER RESP QL IA.RAPID: NEGATIVE
GFR SERPL CREATININE-BSD FRML MDRD: 98 ML/MIN/1.73SQ M
GLUCOSE SERPL-MCNC: 106 MG/DL (ref 65–140)
GLUCOSE UR STRIP-MCNC: NEGATIVE MG/DL
HCT VFR BLD AUTO: 38.6 % (ref 34.8–46.1)
HGB BLD-MCNC: 12.6 G/DL (ref 11.5–15.4)
HGB UR QL STRIP.AUTO: NEGATIVE
IMM GRANULOCYTES # BLD AUTO: 0.03 THOUSAND/UL (ref 0–0.2)
IMM GRANULOCYTES NFR BLD AUTO: 0 % (ref 0–2)
KETONES UR STRIP-MCNC: NEGATIVE MG/DL
LEUKOCYTE ESTERASE UR QL STRIP: ABNORMAL
LIPASE SERPL-CCNC: 30 U/L (ref 11–82)
LYMPHOCYTES # BLD AUTO: 2.49 THOUSANDS/ÂΜL (ref 0.6–4.47)
LYMPHOCYTES NFR BLD AUTO: 29 % (ref 14–44)
MCH RBC QN AUTO: 31.6 PG (ref 26.8–34.3)
MCHC RBC AUTO-ENTMCNC: 32.6 G/DL (ref 31.4–37.4)
MCV RBC AUTO: 97 FL (ref 82–98)
MONOCYTES # BLD AUTO: 0.8 THOUSAND/ÂΜL (ref 0.17–1.22)
MONOCYTES NFR BLD AUTO: 9 % (ref 4–12)
NEUTROPHILS # BLD AUTO: 5.03 THOUSANDS/ÂΜL (ref 1.85–7.62)
NEUTS SEG NFR BLD AUTO: 59 % (ref 43–75)
NITRITE UR QL STRIP: NEGATIVE
NON-SQ EPI CELLS URNS QL MICRO: NORMAL /HPF
NRBC BLD AUTO-RTO: 0 /100 WBCS
PH UR STRIP.AUTO: 6.5 [PH]
PLATELET # BLD AUTO: 214 THOUSANDS/UL (ref 149–390)
PMV BLD AUTO: 10.3 FL (ref 8.9–12.7)
POTASSIUM SERPL-SCNC: 5.3 MMOL/L (ref 3.5–5.3)
PROT SERPL-MCNC: 8.1 G/DL (ref 6.4–8.4)
PROT UR STRIP-MCNC: NEGATIVE MG/DL
RBC # BLD AUTO: 3.99 MILLION/UL (ref 3.81–5.12)
RBC #/AREA URNS AUTO: NORMAL /HPF
SARS-COV+SARS-COV-2 AG RESP QL IA.RAPID: NEGATIVE
SODIUM SERPL-SCNC: 137 MMOL/L (ref 135–147)
SP GR UR STRIP.AUTO: 1.02 (ref 1–1.03)
UROBILINOGEN UR STRIP-ACNC: <2 MG/DL
WBC # BLD AUTO: 8.6 THOUSAND/UL (ref 4.31–10.16)
WBC #/AREA URNS AUTO: NORMAL /HPF

## 2025-04-23 PROCEDURE — 96360 HYDRATION IV INFUSION INIT: CPT

## 2025-04-23 PROCEDURE — 96361 HYDRATE IV INFUSION ADD-ON: CPT

## 2025-04-23 PROCEDURE — 81001 URINALYSIS AUTO W/SCOPE: CPT

## 2025-04-23 PROCEDURE — 99285 EMERGENCY DEPT VISIT HI MDM: CPT

## 2025-04-23 PROCEDURE — 87811 SARS-COV-2 COVID19 W/OPTIC: CPT

## 2025-04-23 PROCEDURE — 85025 COMPLETE CBC W/AUTO DIFF WBC: CPT

## 2025-04-23 PROCEDURE — 80053 COMPREHEN METABOLIC PANEL: CPT

## 2025-04-23 PROCEDURE — 87804 INFLUENZA ASSAY W/OPTIC: CPT

## 2025-04-23 PROCEDURE — 36415 COLL VENOUS BLD VENIPUNCTURE: CPT

## 2025-04-23 PROCEDURE — 83690 ASSAY OF LIPASE: CPT

## 2025-04-23 PROCEDURE — 74177 CT ABD & PELVIS W/CONTRAST: CPT

## 2025-04-23 RX ORDER — ONDANSETRON 4 MG/1
4 TABLET, ORALLY DISINTEGRATING ORAL EVERY 6 HOURS PRN
Qty: 12 TABLET | Refills: 0 | Status: SHIPPED | OUTPATIENT
Start: 2025-04-23 | End: 2025-04-26

## 2025-04-23 RX ADMIN — SODIUM CHLORIDE 1000 ML: 0.9 INJECTION, SOLUTION INTRAVENOUS at 20:32

## 2025-04-23 RX ADMIN — IOHEXOL 100 ML: 350 INJECTION, SOLUTION INTRAVENOUS at 21:45

## 2025-04-23 RX ADMIN — AMOXICILLIN AND CLAVULANATE POTASSIUM 1 TABLET: 875; 125 TABLET, FILM COATED ORAL at 22:37

## 2025-04-23 NOTE — Clinical Note
Gretel Chowdhury was seen and treated in our emergency department on 4/23/2025.                Diagnosis:     Gretel  may return to work on return date.    She may return on this date: 04/25/2025         If you have any questions or concerns, please don't hesitate to call.      Sera Loza PA-C    ______________________________           _______________          _______________  Hospital Representative                              Date                                Time

## 2025-04-24 NOTE — ED PROVIDER NOTES
Time reflects when diagnosis was documented in both MDM as applicable and the Disposition within this note       Time User Action Codes Description Comment    4/23/2025 10:21 PM Sera Loza Add [K57.92] Diverticulitis           ED Disposition       ED Disposition   Discharge    Condition   Stable    Date/Time   Wed Apr 23, 2025 10:20 PM    Comment   Gretel Trenton discharge to home/self care.                   Assessment & Plan       Medical Decision Making  Patient seen, examined and noted to have diverticulitis.  Patient coming in with left-sided abdominal pain.  Prior history of diverticulitis.  Abdomen is tender to palpation.  Plan to order labs and scan.  Disposition pending scan.  CT scan showing diverticulitis without complication so will give first dose of antibiotic here.  She will complete a course at home.  Return precautions discussed with patient expressed understanding.    Differential diagnosis includes but is not limited to constipation, ovarian cyst, diverticulitis, UTI, pyelonephritis, kidney stone, epiploic appendagitis, diverticulitis    Patient's labs notable for: CBC, CMP, lipase, UA and flu/COVID swab all unremarkable    Imaging revealed:   Diverticulitis without complication     Patient appears well, is nontoxic appearing, she expresses understanding and agrees with plan of care at this time.  In light of this patient would benefit from outpatient management.    Patient was rx'd: Augmentin    Patient at time of discharge well-appearing in no acute distress, all questions answered. Patient agreeable to plan.  Patient's vitals, lab/imaging results, diagnosis, and treatment plan were discussed with the patient. All new/changed medications were discussed with patient, specifically, route of administration, how often and when to take, and where they can be picked up. Strict return precautions as well as close follow up with PCP was discussed with the patient and the patient was agreeable to my  recommendations. Patient verbally acknowledged understanding of the above communications. Strict return precautions were provided. All labs reviewed and utilized in the medical decision making process.    Amount and/or Complexity of Data Reviewed  Labs: ordered. Decision-making details documented in ED Course.  Radiology: ordered.    Risk  Prescription drug management.        ED Course as of 04/24/25 0425   Wed Apr 23, 2025 2052 Leukocytes, UA(!): Trace   2052 Bacteria, UA: None Seen       Medications   sodium chloride 0.9 % bolus 1,000 mL (0 mL Intravenous Stopped 4/23/25 2236)   iohexol (OMNIPAQUE) 350 MG/ML injection (MULTI-DOSE) 100 mL (100 mL Intravenous Given 4/23/25 2145)   amoxicillin-clavulanate (AUGMENTIN) 875-125 mg per tablet 1 tablet (1 tablet Oral Given 4/23/25 2237)       ED Risk Strat Scores                    No data recorded                            History of Present Illness       Chief Complaint   Patient presents with    Abdominal Pain     L sided abdominal pain for 2 days. +headache +weakness -nausea, vomiting, constipation.        Past Medical History:   Diagnosis Date    Disease of thyroid gland       Past Surgical History:   Procedure Laterality Date    AUGMENTATION MAMMAPLASTY Bilateral 2007    saline    COSMETIC SURGERY      Breast implants    HERNIA REPAIR      HYSTERECTOMY      partial     NECK SURGERY        Family History   Problem Relation Age of Onset    Colon cancer Father 73    No Known Problems Mother     No Known Problems Sister     No Known Problems Daughter     Pancreatic cancer Maternal Grandmother     No Known Problems Daughter     No Known Problems Maternal Aunt     No Known Problems Paternal Aunt     No Known Problems Paternal Uncle       Social History     Tobacco Use    Smoking status: Never    Smokeless tobacco: Never   Substance Use Topics    Alcohol use: Never    Drug use: Never      E-Cigarette/Vaping      E-Cigarette/Vaping Substances      I have reviewed and  agree with the history as documented.     Gretel Chowdhury is a 59 y.o. female with a PMH of diverticulitis presenting to the ED on April 24, 2025 with abdominal pain. Patient endorses that on Monday she started to have abdominal pain.  It has gotten progressively worse since then.  History of diverticulitis and hysterectomy. Patient denies nausea, vomiting, diarrhea, urinary symptoms, constipation, blood in her stool, fevers, chills or any other complaints at this time.             Review of Systems   Constitutional:  Negative for chills and fever.   Respiratory:  Negative for shortness of breath.    Cardiovascular:  Negative for chest pain.   Gastrointestinal:  Positive for abdominal pain. Negative for constipation, diarrhea, nausea and vomiting.   Genitourinary:  Negative for dysuria, flank pain, frequency, hematuria, pelvic pain and urgency.           Objective       ED Triage Vitals [04/23/25 1917]   Temperature Pulse Blood Pressure Respirations SpO2 Patient Position - Orthostatic VS   98.5 °F (36.9 °C) 91 127/61 18 98 % Sitting      Temp Source Heart Rate Source BP Location FiO2 (%) Pain Score    Oral Monitor Right arm -- --      Vitals      Date and Time Temp Pulse SpO2 Resp BP Pain Score FACES Pain Rating User   04/23/25 1917 98.5 °F (36.9 °C) 91 98 % 18 127/61 -- -- KK            Physical Exam  Vitals and nursing note reviewed.   Constitutional:       General: She is not in acute distress.     Appearance: Normal appearance. She is well-developed and normal weight. She is not ill-appearing, toxic-appearing or diaphoretic.   HENT:      Head: Normocephalic and atraumatic.      Right Ear: External ear normal.      Left Ear: External ear normal.      Nose: Nose normal. No congestion or rhinorrhea.      Mouth/Throat:      Mouth: Mucous membranes are moist.   Eyes:      General:         Right eye: No discharge.         Left eye: No discharge.      Extraocular Movements: Extraocular movements intact.       Conjunctiva/sclera: Conjunctivae normal.   Cardiovascular:      Rate and Rhythm: Normal rate and regular rhythm.      Heart sounds: Normal heart sounds. No murmur heard.     No friction rub. No gallop.   Pulmonary:      Effort: Pulmonary effort is normal. No respiratory distress.      Breath sounds: Normal breath sounds. No wheezing, rhonchi or rales.   Abdominal:      General: Abdomen is flat. Bowel sounds are normal.      Palpations: Abdomen is soft.      Tenderness: There is abdominal tenderness in the left upper quadrant and left lower quadrant. There is no right CVA tenderness, left CVA tenderness, guarding or rebound. Negative signs include Jennings's sign, Rovsing's sign and McBurney's sign.   Musculoskeletal:         General: No signs of injury. Normal range of motion.      Cervical back: Normal range of motion and neck supple. No rigidity.   Skin:     General: Skin is warm.      Capillary Refill: Capillary refill takes less than 2 seconds.      Coloration: Skin is not pale.      Findings: No erythema.   Neurological:      General: No focal deficit present.      Mental Status: She is alert and oriented to person, place, and time. Mental status is at baseline.      Motor: No weakness.      Gait: Gait normal.   Psychiatric:         Mood and Affect: Mood normal.         Behavior: Behavior normal.         Results Reviewed       Procedure Component Value Units Date/Time    FLU/COVID Rapid Antigen (30 min. TAT) - Preferred screening test in ED [[id number]]  (Normal) Collected: 04/23/25 2040    Lab Status: Final result Specimen: Nares from Nose Updated: 04/23/25 2119     SARS COV Rapid Antigen Negative     Influenza A Rapid Antigen Negative     Influenza B Rapid Antigen Negative    Narrative:      This test has been performed using the [employer] Hortencia 2 FLU+SARS Antigen test under the Emergency Use Authorization (EUA). This test has been validated by the  and verified by the performing laboratory. The Hortencia  uses lateral flow immunofluorescent sandwich assay to detect SARS-COV, Influenza A and Influenza B Antigen.     The [employer] Hortencia 2 SARS Antigen test does not differentiate between SARS-CoV and SARS-CoV-2.     Negative results are presumptive and may be confirmed with a molecular assay, if necessary, for patient management. Negative results do not rule out SARS-CoV-2 or influenza infection and should not be used as the sole basis for treatment or patient management decisions. A negative test result may occur if the level of antigen in a sample is below the limit of detection of this test.     Positive results are indicative of the presence of viral antigens, but do not rule out bacterial infection or co-infection with other viruses.     All test results should be used as an adjunct to clinical observations and other information available to the provider.    FOR PEDIATRIC PATIENTS - copy/paste COVID Guidelines URL to browser: [email or web address]    Urine Microscopic [[id number]]  (Normal) Collected: 04/23/25 2040    Lab Status: Final result Specimen: Urine, Clean Catch Updated: 04/23/25 2050     RBC, UA 1-2 /hpf      WBC, UA 1-2 /hpf      Epithelial Cells Occasional /hpf      Bacteria, UA None Seen /hpf     UA w Reflex to Microscopic w Reflex to Culture [[id number]]  (Abnormal) Collected: 04/23/25 2040    Lab Status: Final result Specimen: Urine, Clean Catch Updated: 04/23/25 2049     Color, UA Light Yellow     Clarity, UA Clear     Specific Gravity, UA 1.017     pH, UA 6.5     Leukocytes, UA Trace     Nitrite, UA Negative     Protein, UA Negative mg/dl      Glucose, UA Negative mg/dl      Ketones, UA Negative mg/dl      Urobilinogen, UA <2.0 mg/dl      Bilirubin, UA Negative     Occult Blood, UA Negative    Comprehensive metabolic panel [[id number]]  (Abnormal) Collected: 04/23/25 1945    Lab Status: Final result Specimen: Blood from Arm, Right Updated: 04/23/25  2009     Sodium 137 mmol/L      Potassium 5.3 mmol/L      Chloride 105 mmol/L      CO2 23 mmol/L      ANION GAP 9 mmol/L      BUN 16 mg/dL      Creatinine 0.64 mg/dL      Glucose 106 mg/dL      Calcium 9.5 mg/dL      AST 42 U/L      ALT 36 U/L      Alkaline Phosphatase 125 U/L      Total Protein 8.1 g/dL      Albumin 4.5 g/dL      Total Bilirubin 0.37 mg/dL      eGFR 98 ml/min/1.73sq m     Narrative:      [employer] Kid[employer] guidelines for Chronic Kidney Disease (CKD):     Stage 1 with normal or high GFR (GFR > 90 mL/min/1.73 square meters)    Stage 2 Mild CKD (GFR = 60-89 mL/min/1.73 square meters)    Stage 3A Moderate CKD (GFR = 45-59 mL/min/1.73 square meters)    Stage 3B Moderate CKD (GFR = 30-44 mL/min/1.73 square meters)    Stage 4 Severe CKD (GFR = 15-29 mL/min/1.73 square meters)    Stage 5 End Stage CKD (GFR <15 mL/min/1.73 square meters)  Note: GFR calculation is accurate only with a steady state creatinine    Lipase [[id number]]  (Normal) Collected: 04/23/25 1945    Lab Status: Final result Specimen: Blood from Arm, Right Updated: 04/23/25 2009     Lipase 30 u/L     CBC and differential [[id number]] Collected: 04/23/25 1945    Lab Status: Final result Specimen: Blood from Arm, Right Updated: 04/23/25 1950     WBC 8.60 Thousand/uL      RBC 3.99 Million/uL      Hemoglobin 12.6 g/dL      Hematocrit 38.6 %      MCV 97 fL      MCH 31.6 pg      MCHC 32.6 g/dL      RDW 13.9 %      MPV 10.3 fL      Platelets 214 Thousands/uL      nRBC 0 /100 WBCs      Segmented % 59 %      Immature Grans % 0 %      Lymphocytes % 29 %      Monocytes % 9 %      Eosinophils Relative 3 %      Basophils Relative 0 %      Absolute Neutrophils 5.03 Thousands/µL      Absolute Immature Grans 0.03 Thousand/uL      Absolute Lymphocytes 2.49 Thousands/µL      Absolute Monocytes 0.80 Thousand/µL      Eosinophils Absolute 0.23 Thousand/µL      Basophils Absolute 0.02 Thousands/µL             CT abdomen pelvis with contrast   Final  Interpretation by Saniya Alexandre MD (04/23 2214)      Acute diverticulitis of mid descending colon. No evidence of abscess.      Trace ascites.      The study was marked in EPIC for immediate notification.         Workstation performed: [id number]             Procedures    ED Medication and Procedure Management   Prior to Admission Medications   Prescriptions Last Dose Informant Patient Reported? Taking?   levothyroxine 150 mcg tablet   Yes No   Sig: Take 150 mcg by mouth daily   ondansetron (ZOFRAN) 4 mg tablet   No No   Sig: Take 1 tablet (4 mg total) by mouth every 8 (eight) hours as needed for nausea or vomiting for up to 3 days      Facility-Administered Medications: None     Discharge Medication List as of 4/23/2025 10:27 PM        START taking these medications    Details   amoxicillin-clavulanate (AUGMENTIN) 875-125 mg per tablet Take 1 tablet by mouth 3 (three) times a day for 5 days, Starting Wed 4/23/2025, Until Mon 4/28/2025, Normal           CONTINUE these medications which have NOT CHANGED    Details   levothyroxine 150 mcg tablet Take 150 mcg by mouth daily, Historical Med      ondansetron (ZOFRAN) 4 mg tablet Take 1 tablet (4 mg total) by mouth every 8 (eight) hours as needed for nausea or vomiting for up to 3 days, Starting Wed 2/21/2024, Until Sat 2/24/2024 at 2359, Normal           No discharge procedures on file.  ED SEPSIS DOCUMENTATION   Time reflects when diagnosis was documented in both MDM as applicable and the Disposition within this note       Time User Action Codes Description Comment    4/23/2025 10:21 PM Sera Loza Add [K57.92] Diverticulitis                  Sera Loza PA-C  04/24/25 2882

## 2025-04-24 NOTE — DISCHARGE INSTRUCTIONS
Please take your antibiotics 3 times a day for the next 5 days. Follow up with your PCP regarding your diverticulitis. Return to the ER if you develop fevers, worsening pain or can't keep any food or water down.

## 2025-08-14 ENCOUNTER — HOSPITAL ENCOUNTER (OUTPATIENT)
Dept: RADIOLOGY | Facility: HOSPITAL | Age: 60
Discharge: HOME | End: 2025-08-14
Payer: COMMERCIAL